# Patient Record
Sex: FEMALE | Race: WHITE | URBAN - METROPOLITAN AREA
[De-identification: names, ages, dates, MRNs, and addresses within clinical notes are randomized per-mention and may not be internally consistent; named-entity substitution may affect disease eponyms.]

---

## 2017-01-06 ENCOUNTER — TRANSFERRED RECORDS (OUTPATIENT)
Dept: HEALTH INFORMATION MANAGEMENT | Facility: CLINIC | Age: 27
End: 2017-01-06

## 2017-07-21 ENCOUNTER — OFFICE VISIT (OUTPATIENT)
Dept: FAMILY MEDICINE | Facility: CLINIC | Age: 27
End: 2017-07-21
Payer: COMMERCIAL

## 2017-07-21 VITALS
HEART RATE: 67 BPM | DIASTOLIC BLOOD PRESSURE: 74 MMHG | OXYGEN SATURATION: 98 % | HEIGHT: 64 IN | SYSTOLIC BLOOD PRESSURE: 104 MMHG | BODY MASS INDEX: 22.16 KG/M2 | TEMPERATURE: 97 F | WEIGHT: 129.8 LBS

## 2017-07-21 DIAGNOSIS — Z23 NEED FOR PROPHYLACTIC VACCINATION WITH TETANUS-DIPHTHERIA (TD): ICD-10-CM

## 2017-07-21 DIAGNOSIS — N75.0 CYST OF LEFT BARTHOLIN'S GLAND: ICD-10-CM

## 2017-07-21 DIAGNOSIS — Z00.00 ROUTINE GENERAL MEDICAL EXAMINATION AT A HEALTH CARE FACILITY: Primary | ICD-10-CM

## 2017-07-21 DIAGNOSIS — Z23 NEED FOR HPV VACCINE: ICD-10-CM

## 2017-07-21 DIAGNOSIS — Z11.3 SCREENING EXAMINATION FOR VENEREAL DISEASE: ICD-10-CM

## 2017-07-21 DIAGNOSIS — Z12.4 SCREENING FOR MALIGNANT NEOPLASM OF CERVIX: ICD-10-CM

## 2017-07-21 DIAGNOSIS — J31.0 OTHER CHRONIC RHINITIS: ICD-10-CM

## 2017-07-21 PROCEDURE — G0145 SCR C/V CYTO,THINLAYER,RESCR: HCPCS | Performed by: FAMILY MEDICINE

## 2017-07-21 PROCEDURE — 87491 CHLMYD TRACH DNA AMP PROBE: CPT | Performed by: FAMILY MEDICINE

## 2017-07-21 PROCEDURE — 87591 N.GONORRHOEAE DNA AMP PROB: CPT | Performed by: FAMILY MEDICINE

## 2017-07-21 PROCEDURE — 99385 PREV VISIT NEW AGE 18-39: CPT | Performed by: FAMILY MEDICINE

## 2017-07-21 RX ORDER — MONTELUKAST SODIUM 10 MG/1
10 TABLET ORAL AT BEDTIME
Qty: 30 TABLET | Refills: 11 | Status: SHIPPED | OUTPATIENT
Start: 2017-07-21 | End: 2018-05-07

## 2017-07-21 RX ORDER — FLUTICASONE PROPIONATE 50 MCG
2 SPRAY, SUSPENSION (ML) NASAL DAILY
Qty: 3 BOTTLE | Refills: 11 | COMMUNITY

## 2017-07-21 RX ORDER — LORATADINE 10 MG/1
10 TABLET ORAL DAILY
Qty: 30 TABLET | Refills: 1 | COMMUNITY

## 2017-07-21 RX ORDER — MOMETASONE FUROATE MONOHYDRATE 50 UG/1
2 SPRAY, METERED NASAL DAILY
Qty: 1 BOX | Refills: 11 | Status: SHIPPED | OUTPATIENT
Start: 2017-07-21 | End: 2018-05-07

## 2017-07-21 NOTE — PROGRESS NOTES
SUBJECTIVE:   CC: Keturah Mcnair is an 26 year old woman who presents for preventive health visit.   She reports post nasal dripping constantly, she does take Flonase over the counter and also Claritin she reports there was a plan to consider nasonex but not sure if its covered by insurance. She reports she has house cats for past 3 years, not sure if allergic to cats    She reports history of left bartholin cyst- and would like to know if she would need OBG/GYN referral for recurrence      She reports she is not sure if she noted breast lump on left self  breast exam 2 weeks ago. She reports there is no previous history of breast lump, family history of breast cancer, she is is nonsmoker      Healthy Habits:    Do you get at least three servings of calcium containing foods daily (dairy, green leafy vegetables, etc.)? yes    Amount of exercise or daily activities, outside of work: 4 day(s) per week 30 min    Problems taking medications regularly No    Medication side effects: No    Have you had an eye exam in the past two years? yes    Do you see a dentist twice per year? yes  Do you have sleep apnea, excessive snoring or daytime drowsiness?no      Today's PHQ-2 Score:   PHQ-2 ( 1999 Pfizer) 7/21/2017   Q1: Little interest or pleasure in doing things 0   Q2: Feeling down, depressed or hopeless 0   PHQ-2 Score 0         Abuse: Current or Past(Physical, Sexual or Emotional)- No  Do you feel safe in your environment - Yes  Social History   Substance Use Topics     Smoking status: Never Smoker     Smokeless tobacco: Never Used     Alcohol use Not on file     The patient does not drink >3 drinks per day nor >7 drinks per week.    Reviewed orders with patient.  Reviewed health maintenance and updated orders accordingly - Yes  Labs reviewed in EPIC    Mammogram not appropriate for this patient based on age.    Pertinent mammograms are reviewed under the imaging tab.  History of abnormal Pap smear: NO - age 21-29 PAP  "every 3 years recommended    Reviewed and updated as needed this visit by clinical staff  Tobacco  Allergies  Meds  Soc Hx        Reviewed and updated as needed this visit by Provider              ROS:  C: NEGATIVE for fever, chills, change in weight  I: NEGATIVE for worrisome rashes, moles or lesions  E: NEGATIVE for vision changes or irritation  ENT: POSITIVE for post nasal dripping   R: NEGATIVE for significant cough or SOB  BREAST: as in History of presenting illness   CV: NEGATIVE for chest pain, palpitations or peripheral edema  GI: NEGATIVE for nausea, abdominal pain, heartburn, or change in bowel habits  : NEGATIVE for unusual urinary or vaginal symptoms. Periods are regular.  M: NEGATIVE for significant arthralgias or myalgia  N: NEGATIVE for weakness, dizziness or paresthesias  P: NEGATIVE for changes in mood or affect    OBJECTIVE:   /74  Pulse 67  Temp 97  F (36.1  C) (Oral)  Ht 5' 4.25\" (1.632 m)  Wt 129 lb 12.8 oz (58.9 kg)  LMP 07/17/2017  SpO2 98%  BMI 22.11 kg/m2  EXAM:  GENERAL: healthy, alert and no distress  EYES: Eyes grossly normal to inspection, PERRL and conjunctivae and sclerae normal  HENT: ear canals and TM's normal, nose and mouth without ulcers or lesions  NECK: no adenopathy, no asymmetry, masses, or scars and thyroid normal to palpation  RESP: lungs clear to auscultation - no rales, rhonchi or wheezes  BREAST: normal without masses, tenderness or nipple discharge and no palpable axillary masses or adenopathy  CV: regular rate and rhythm, normal S1 S2, no S3 or S4, no murmur, click or rub, no peripheral edema and peripheral pulses strong  ABDOMEN: soft, nontender, no hepatosplenomegaly, no masses and bowel sounds normal   (female): normal female external genitalia, normal urethral meatus, vaginal mucosa pink, moist, well rugated, IUD in place. Normal mensuration/ cervix/adnexa/uterus without masses or discharge  MS: no gross musculoskeletal defects noted, no " "edema  SKIN: no suspicious lesions or rashes  NEURO: Normal strength and tone, mentation intact and speech normal  PSYCH: mentation appears normal, affect normal/bright    ASSESSMENT/PLAN:   (Z00.00) Routine general medical examination at a health care facility  (primary encounter diagnosis)  Plan: Please see patient instructions   No lumps on breast identifed- advised to continue self breast exam and if any concern, follow up as needed       (J31.0) Other chronic rhinitis  Plan: mometasone (NASONEX) 50 MCG/ACT spray, once daily as needed          montelukast (SINGULAIR) 10 MG tablet once daily as needed  For 2 weeks, if not better follow up as needed    Potential medication side effects were discussed with the patient; let me know if any occur.      (N75.0) Cyst of left Bartholin's gland  Comment: not acutly inflammed or tender  Plan: OB/GYN REFERRAL if recurrance      (Z11.3) Screening examination for venereal disease  Plan:  Chlamydia & gonorrhea     (Z12.4) Screening for malignant neoplasm of cervix  Plan: Pap imaged thin layer screen reflex to HPV if            (Z23) Need for HPV vaccine  (Z23) Need for prophylactic vaccination with tetanus-diphtheria (TD)  Plan: discussed- patient deffered      COUNSELING:   Reviewed preventive health counseling, as reflected in patient instructions       Regular exercise       Healthy diet/nutrition         reports that she has never smoked. She has never used smokeless tobacco.    Estimated body mass index is 22.11 kg/(m^2) as calculated from the following:    Height as of this encounter: 5' 4.25\" (1.632 m).    Weight as of this encounter: 129 lb 12.8 oz (58.9 kg).         Counseling Resources:  ATP IV Guidelines  Pooled Cohorts Equation Calculator  Breast Cancer Risk Calculator  FRAX Risk Assessment  ICSI Preventive Guidelines  Dietary Guidelines for Americans, 2010  USDA's MyPlate  ASA Prophylaxis  Lung CA Screening    Sirisha Ortega MD  [facility]  "

## 2017-07-21 NOTE — PATIENT INSTRUCTIONS
Regarding post nasal dripping- from  Chronic rhinitis- of unspecified allergy  Start montelukast 10 mg once daily   nasonex nasal spray once daily as needed - another option      Preventive Health Recommendations  Female Ages 18 to 25     Yearly exam:     See your health care provider every year in order to  o Review health changes.   o Discuss preventive care.    o Review your medicines if your doctor has prescribed any.      You should be tested each year for STDs (sexually transmitted diseases).       After age 20, talk to your provider about how often you should have cholesterol testing.      Starting at age 21, get a Pap test every three years. If you have an abnormal result, your doctor may have you test more often.      If you are at risk for diabetes, you should have a diabetes test (fasting glucose).     Shots:     Get a flu shot each year.     Get a tetanus shot every 10 years.     Consider getting the shot (vaccine) that prevents cervical cancer (Gardasil).    Nutrition:     Eat at least 5 servings of fruits and vegetables each day.    Eat whole-grain bread, whole-wheat pasta and brown rice instead of white grains and rice.    Talk to your provider about Calcium and Vitamin D.     Lifestyle    Exercise at least 150 minutes a week each week (30 minutes a day, 5 days a week). This will help you control your weight and prevent disease.    Limit alcohol to one drink per day.    No smoking.     Wear sunscreen to prevent skin cancer.    See your dentist every six months for an exam and cleaning.

## 2017-07-21 NOTE — MR AVS SNAPSHOT
After Visit Summary   7/21/2017    Keturah Mcnair    MRN: [id number]           Patient Information     Date Of Birth          1990        Visit Information        Provider Department      7/21/2017 3:00 PM Sirisha Ortega MD [facility]        Today's Diagnoses     Routine general medical examination at a health care facility    -  1    Other chronic rhinitis        Cyst of left Bartholin's gland        Screening examination for venereal disease        Screening for malignant neoplasm of cervix        Need for HPV vaccine        Need for prophylactic vaccination with tetanus-diphtheria (TD)          Care Instructions    Regarding post nasal dripping- from  Chronic rhinitis- of unspecified allergy  Start montelukast 10 mg once daily   nasonex nasal spray once daily as needed - another option      Preventive Health Recommendations  Female Ages 18 to 25     Yearly exam:     See your health care provider every year in order to  o Review health changes.   o Discuss preventive care.    o Review your medicines if your doctor has prescribed any.      You should be tested each year for STDs (sexually transmitted diseases).       After age 20, talk to your provider about how often you should have cholesterol testing.      Starting at age 21, get a Pap test every three years. If you have an abnormal result, your doctor may have you test more often.      If you are at risk for diabetes, you should have a diabetes test (fasting glucose).     Shots:     Get a flu shot each year.     Get a tetanus shot every 10 years.     Consider getting the shot (vaccine) that prevents cervical cancer (Gardasil).    Nutrition:     Eat at least 5 servings of fruits and vegetables each day.    Eat whole-grain bread, whole-wheat pasta and brown rice instead of white grains and rice.    Talk to your provider about Calcium and Vitamin D.     Lifestyle    Exercise at least 150 minutes a week each week (30 minutes  a day, 5 days a week). This will help you control your weight and prevent disease.    Limit alcohol to one drink per day.    No smoking.     Wear sunscreen to prevent skin cancer.    See your dentist every six months for an exam and cleaning.          Follow-ups after your visit        Additional Services     OB/GYN REFERRAL       Your provider has referred you to:  FM[facility]: [facility] [phone]   [email or web address]  UMP: [facility] [phone]   [email or web address]    Please be aware that coverage of these services is subject to the terms and limitations of your health insurance plan.  Call member services at your health plan with any benefit or coverage questions.      Please bring the following with you to your appointment:    (1) Any X-Rays, CTs or MRIs which have been performed.  Contact the facility where they were done to arrange for  prior to your scheduled appointment.   (2) List of current medications   (3) This referral request   (4) Any documents/labs given to you for this referral                  Who to contact     If you have questions or need follow up information about today's clinic visit or your schedule please contact [facility] directly at [phone].  Normal or non-critical lab and imaging results will be communicated to you by MyChart, letter or phone within 4 business days after the clinic has received the results. If you do not hear from us within 7 days, please contact the clinic through MyChart or phone. If you have a critical or abnormal lab result, we will notify you by phone as soon as possible.  Submit refill requests through [employer] or call your pharmacy and they will forward the refill request to us. Please allow 3 business days for your refill to be completed.          Additional Information About Your Visit        [address]  "Information     [facility] gives you secure access to your electronic health record. If you see a primary care provider, you can also send messages to your care team and make appointments. If you have questions, please call your primary care clinic.  If you do not have a primary care provider, please call [phone] and they will assist you.        Care EveryWhere ID     This is your Care EveryWhere ID. This could be used by other organizations to access your [facility] medical records  [id number]        Your Vitals Were     Pulse Temperature Height Last Period Pulse Oximetry BMI (Body Mass Index)    67 97  F (36.1  C) (Oral) 5' 4.25\" (1.632 m) 07/17/2017 98% 22.11 kg/m2       Blood Pressure from Last 3 Encounters:   07/21/17 104/74    Weight from Last 3 Encounters:   07/21/17 129 lb 12.8 oz (58.9 kg)              We Performed the Following     CHLAMYDIA TRACHOMATIS PCR     NEISSERIA GONORRHOEA PCR     OB/GYN REFERRAL     Pap imaged thin layer screen reflex to HPV if ASCUS - recommend age 25 - 29          Today's Medication Changes          These changes are accurate as of: 7/21/17 11:59 PM.  If you have any questions, ask your nurse or doctor.               Start taking these medicines.        Dose/Directions    mometasone 50 MCG/ACT spray   Commonly known as:  NASONEX   Used for:  Other chronic rhinitis   Started by:  Sirisha Ortega MD        Dose:  2 spray   Spray 2 sprays into both nostrils daily   Quantity:  1 Box   Refills:  11       montelukast 10 MG tablet   Commonly known as:  SINGULAIR   Used for:  Other chronic rhinitis   Started by:  Sirisha Ortega MD        Dose:  10 mg   Take 1 tablet (10 mg) by mouth At Bedtime   Quantity:  30 tablet   Refills:  11            Where to get your medicines      These medications were sent to [facility] [place], [place] - [address]  [address] [address]     Phone:  [phone]     mometasone 50 MCG/ACT spray    " montelukast 10 MG tablet                Primary Care Provider    None Specified       No primary provider on file.        Equal Access to Services     ERNESTOFATOUMATA [facility] : Hadii aad ku hadmltreva Oliva, walaurada chiquiyoha, mabelyoon pachecosheridanarcenio pereapegarcenio, waxay idiin haytayloriesha poonanglelivia gaston. So [facility] [phone].    ATENCIÓN: Si habla español, tiene a beckwith disposición servicios gratuitos de asistencia lingüística. Llame al [phone].    We comply with applicable federal civil rights laws and [place] laws. We do not discriminate on the basis of race, color, national origin, age, disability sex, sexual orientation or gender identity.            Thank you!     Thank you for choosing [facility]  for your care. Our goal is always to provide you with excellent care. Hearing back from our patients is one way we can continue to improve our services. Please take a few minutes to complete the written survey that you may receive in the mail after your visit with us. Thank you!             Your Updated Medication List - Protect others around you: Learn how to safely use, store and throw away your medicines at www.disposemymeds.org.          This list is accurate as of: 7/21/17 11:59 PM.  Always use your most recent med list.                   Brand Name Dispense Instructions for use Diagnosis    CLARITIN 10 MG tablet   Generic drug:  loratadine     30 tablet    Take 1 tablet (10 mg) by mouth daily        fluticasone 50 MCG/ACT spray    FLONASE    3 Bottle    Spray 2 sprays into both nostrils daily        mometasone 50 MCG/ACT spray    NASONEX    1 Box    Spray 2 sprays into both nostrils daily    Other chronic rhinitis       montelukast 10 MG tablet    SINGULAIR    30 tablet    Take 1 tablet (10 mg) by mouth At Bedtime    Other chronic rhinitis

## 2017-07-21 NOTE — NURSING NOTE
"Chief Complaint   Patient presents with     Physical     not fasting     /74  Pulse 67  Temp 97  F (36.1  C) (Oral)  Ht 5' 4.25\" (1.632 m)  Wt 129 lb 12.8 oz (58.9 kg)  LMP 07/17/2017  SpO2 98%  BMI 22.11 kg/m2 Estimated body mass index is 22.11 kg/(m^2) as calculated from the following:    Height as of this encounter: 5' 4.25\" (1.632 m).    Weight as of this encounter: 129 lb 12.8 oz (58.9 kg).  BP completed using cuff size: regular       Health Maintenance due pending provider review:  Pap Smear    PAP--Possibly completing today, per Provider review      Washington Chun CMA  "

## 2017-07-25 LAB
C TRACH DNA SPEC QL NAA+PROBE: NORMAL
N GONORRHOEA DNA SPEC QL NAA+PROBE: NORMAL
SPECIMEN SOURCE: NORMAL
SPECIMEN SOURCE: NORMAL

## 2017-07-27 LAB
COPATH REPORT: NORMAL
PAP: NORMAL

## 2017-09-21 ENCOUNTER — RADIANT APPOINTMENT (OUTPATIENT)
Dept: GENERAL RADIOLOGY | Facility: CLINIC | Age: 27
End: 2017-09-21
Attending: FAMILY MEDICINE
Payer: COMMERCIAL

## 2017-09-21 ENCOUNTER — OFFICE VISIT (OUTPATIENT)
Dept: ORTHOPEDICS | Facility: CLINIC | Age: 27
End: 2017-09-21
Payer: COMMERCIAL

## 2017-09-21 VITALS
WEIGHT: 129 LBS | SYSTOLIC BLOOD PRESSURE: 104 MMHG | DIASTOLIC BLOOD PRESSURE: 74 MMHG | BODY MASS INDEX: 22.02 KG/M2 | HEIGHT: 64 IN

## 2017-09-21 DIAGNOSIS — G25.89 SCAPULAR DYSKINESIS: ICD-10-CM

## 2017-09-21 DIAGNOSIS — M25.511 PAIN IN JOINT OF RIGHT SHOULDER: ICD-10-CM

## 2017-09-21 DIAGNOSIS — M75.41 IMPINGEMENT SYNDROME OF RIGHT SHOULDER: Primary | ICD-10-CM

## 2017-09-21 PROCEDURE — 99213 OFFICE O/P EST LOW 20 MIN: CPT | Performed by: FAMILY MEDICINE

## 2017-09-21 PROCEDURE — 73030 X-RAY EXAM OF SHOULDER: CPT | Mod: RT

## 2017-09-21 ASSESSMENT — ENCOUNTER SYMPTOMS
SENSORY CHANGE: 0
TINGLING: 0
FOCAL WEAKNESS: 0
NECK PAIN: 0

## 2017-09-21 NOTE — NURSING NOTE
"Chief Complaint   Patient presents with     Shoulder Pain       Initial /74  Ht 5' 4.25\" (1.632 m)  Wt 129 lb (58.5 kg)  BMI 21.97 kg/m2 Estimated body mass index is 21.97 kg/(m^2) as calculated from the following:    Height as of this encounter: 5' 4.25\" (1.632 m).    Weight as of this encounter: 129 lb (58.5 kg).  Medication Reconciliation: complete    "

## 2017-09-21 NOTE — MR AVS SNAPSHOT
After Visit Summary   9/21/2017    Keturah Mcnair    MRN: [id number]           Patient Information     Date Of Birth          1990        Visit Information        Provider Department      9/21/2017 11:00 AM Gee Boswell MD [facility]        Today's Diagnoses     Impingement syndrome of right shoulder    -  1    Scapular dyskinesis        Pain in joint of right shoulder           Follow-ups after your visit        Additional Services     [facility] PT, HAND, AND CHIROPRACTIC REFERRAL       **This order will print in the [facility] Scheduling Office**    Physical Therapy, Hand Therapy and Chiropractic Care are available through:    *[facility]  *[facility]  *[facility]    Call one number to schedule at any of the above locations: [phone].    Your provider has referred you to: Physical Therapy at [facility] or [facility]     Indication/Reason for Referral:    Impingement syndrome of right shoulder  Scapular dyskinesis    Onset of Illness:   Therapy Orders: Evaluate and Treat  Special Programs: None  Special Request: None    Christiana Villeda      Additional Comments for the Therapist or Chiropractor:     Please be aware that coverage of these services is subject to the terms and limitations of your health insurance plan.  Call member services at your health plan with any benefit or coverage questions.      Please bring the following to your appointment:    *Your personal calendar for scheduling future appointments  *Comfortable clothing                  Who to contact     If you have questions or need follow up information about today's clinic visit or your schedule please contact [facility] directly at [phone].  Normal or non-critical lab and imaging results will be communicated to you by MyChart, letter or phone within 4 business days after  "the clinic has received the results. If you do not hear from us within 7 days, please contact the clinic through [employer] or phone. If you have a critical or abnormal lab result, we will notify you by phone as soon as possible.  Submit refill requests through [employer] or call your pharmacy and they will forward the refill request to us. Please allow 3 business days for your refill to be completed.          Additional Information About Your Visit        [employer] Information     [employer] gives you secure access to your electronic health record. If you see a primary care provider, you can also send messages to your care team and make appointments. If you have questions, please call your primary care clinic.  If you do not have a primary care provider, please call [phone] and they will assist you.        Care EveryWhere ID     This is your Care EveryWhere ID. This could be used by other organizations to access your [facility] medical records  [id number]        Your Vitals Were     Height BMI (Body Mass Index)                5' 4.25\" (1.632 m) 21.97 kg/m2           Blood Pressure from Last 3 Encounters:   09/21/17 104/74   07/21/17 104/74    Weight from Last 3 Encounters:   09/21/17 129 lb (58.5 kg)   07/21/17 129 lb 12.8 oz (58.9 kg)              We Performed the Following     [facility] PT, HAND, AND CHIROPRACTIC REFERRAL        Primary Care Provider    None Specified       No primary provider on file.        Equal Access to Services     PARTHA GONZALEZ : Hadii nicole crespo Sotiffany, waaxda luqadaha, qaybta kaalfrancy anderson . So [facility] [phone].    ATENCIÓN: Si habla español, tiene a beckwith disposición servicios gratuitos de asistencia lingüística. Tegan al [phone].    We comply with applicable federal civil rights laws and [place] laws. We do not discriminate on the basis of race, color, national origin, age, disability sex, sexual orientation or gender identity.          "   Thank you!     Thank you for choosing [facility]  for your care. Our goal is always to provide you with excellent care. Hearing back from our patients is one way we can continue to improve our services. Please take a few minutes to complete the written survey that you may receive in the mail after your visit with us. Thank you!             Your Updated Medication List - Protect others around you: Learn how to safely use, store and throw away your medicines at www.disposemymeds.org.          This list is accurate as of: 9/21/17 12:17 PM.  Always use your most recent med list.                   Brand Name Dispense Instructions for use Diagnosis    CLARITIN 10 MG tablet   Generic drug:  loratadine     30 tablet    Take 1 tablet (10 mg) by mouth daily        fluticasone 50 MCG/ACT spray    FLONASE    3 Bottle    Spray 2 sprays into both nostrils daily        mometasone 50 MCG/ACT spray    NASONEX    1 Box    Spray 2 sprays into both nostrils daily    Other chronic rhinitis       montelukast 10 MG tablet    SINGULAIR    30 tablet    Take 1 tablet (10 mg) by mouth At Bedtime    Other chronic rhinitis

## 2017-09-21 NOTE — PROGRESS NOTES
[facility]   Clinic Visit s Sep 21, 2017    PCP: No primary care provider on file.      Keturah is a 26 year old female who is seen as self referral for   Chief Complaint   Patient presents with     Shoulder Pain       Injury: Patient describes injury as sleeping on a firm mattress on her right side.      right-hand dominant    Location of Pain: right anterior shoulder  Duration of Pain: 6 month(s)  Rating of Pain at worst: 8/10  Rating of Pain Currently: 3/10  Pain is worse with: computer work, at night (wakes her up)  Pain is better with: rest  Treatment so far consists of: rest/activity avoidance, ice, ibuprofen and home exercises  Associated symptoms: radiating pain to hands, occasional loss of strength, just over the last couple weeks, .   Prior History of related problems: none    Social History: works at nurse     Hard to sleep d/t pain.    Patient Active Problem List    Diagnosis Date Noted     Cyst of left Bartholin's gland 07/27/2017     Priority: Medium     not acutly inflmmed or tender         Other chronic rhinitis 07/21/2017     Priority: Medium       Family History   Problem Relation Age of Onset     HEART DISEASE Maternal Grandmother      Thyroid Disease Maternal Grandmother      Thyroid Disease Paternal Grandmother      Coronary Artery Disease Paternal Grandfather      Hypertension Paternal Grandfather      CEREBROVASCULAR DISEASE Paternal Grandfather      Prostate Cancer Paternal Grandfather        Social History     Social History     Marital status: Single     Spouse name: N/A     Number of children: N/A     Years of education: N/A     Social History Main Topics     Smoking status: Never Smoker     Smokeless tobacco: Never Used     Alcohol use None     Drug use: No     Past Surgical History:   Procedure Laterality Date     MARSUPIALIZATION BARTHOLIN CYST Left 12/2016       Review of Systems   Musculoskeletal: Positive for joint pain. Negative for neck pain.  "  Neurological: Negative for tingling, sensory change and focal weakness.   All other systems reviewed and are negative.        Physical Exam  /74  Ht 5' 4.25\" (1.632 m)  Wt 129 lb (58.5 kg)  BMI 21.97 kg/m2  Constitutional:well-developed, well-nourished, and in no distress.   Cardiovascular: Intact distal pulses.    Neurological: alert. Gait Normal:   Gait, station, stance, and balance appear normal for age  Skin: Skin is warm and dry.   Psychiatric: Mood and affect normal.   Respiratory: unlabored, speaks in full sentences  Lymph: no LAD, no lymphangitis    Left Shoulder Exam     Tenderness   None    Range of Motion   Active Abduction:                       Normal  Passive Abduction:                    Normal  Extension:                                  Normal  Forward Flexion:                        Normal  External Rotation:                      Normal  Internal Rotation 0 degrees:      Normal  Internal Rotation 90 degrees:    Normal    Muscle Strength   Abduction:            5/5  Internal Rotation:  5/5  External Rotation: 5/5  Supraspinatus:     5/5  Subscapularis:     5/5  Biceps:                 5/5    Tests   Impingement:   Negative  Blanca:          Negative  Cross Arm:      Negative  Drop Arm:        Negative  Apprehension: Negative  Sulcus:            Negative    Right Shoulder Exam     Tenderness   The patient is experiencing tenderness in the anterior and anterolateral upper arm.    Range of Motion   Active Abduction:                       Normal  Passive Abduction:                    Normal  Extension:                                  Normal  Forward Flexion:                        Normal  External Rotation:                      Normal  Internal Rotation 0 degrees:      Normal  Internal Rotation 90 degrees:    Normal    Muscle Strength   Abduction:            5/5  Internal Rotation:  5/5  External Rotation: 5/5  Supraspinatus:     5/5  Subscapularis:     5/5  Biceps:                 " 5/5    Tests   Impingement:   Negative  Blanca:          Positive  Cross Arm:      Negative  Drop Arm:        Positive  Apprehension: Negative  Sulcus:            Negative    Comments:  Inferior scapular pole prominent.  Hyperlordotic posture  Mild scapular asymmetry compared to LEFT side            X-ray images Ordered and independently reviewed by me in the office today with the patient. X-ray shows: normal shoulder    ASSESSMENT/PLAN    ICD-10-CM    1. Impingement syndrome of right shoulder M75.41 [facility] PT, HAND, AND CHIROPRACTIC REFERRAL   2. Scapular dyskinesis G25.89 [facility] PT, HAND, AND CHIROPRACTIC REFERRAL   3. Pain in joint of right shoulder M25.511 XR Shoulder Right G/E 3 Views     Atypical onset, uncertain of trigger from sleeping on firm mattress.    Discussed nature of impingement syndromes in association with scapular dysfunction and the possibility of AC spurring creating a mechanical impingement of the rotator cuff structures.  Treatment options reviewed including medications, physical therapy, and injections  Pt declines cortisone steroid injection despite sleep disruption, may return at any time if for cortisone steroid injection if desired.    Will send to PHYSICAL THERAPY to address scapular and RC mechanics.

## 2017-09-25 ENCOUNTER — TELEPHONE (OUTPATIENT)
Dept: FAMILY MEDICINE | Facility: CLINIC | Age: 27
End: 2017-09-25

## 2017-09-25 DIAGNOSIS — Z23 NEED FOR VACCINATION: Primary | ICD-10-CM

## 2017-09-25 NOTE — TELEPHONE ENCOUNTER
Reason for Call: Request for an order or referral:    Order or referral being requested: HPV shot    Date needed: as soon as possible    Has the patient been seen by the PCP for this problem? YES    Additional comments: appt 10/2    Phone number Patient can be reached at:  Home number on file [phone] (home)    Best Time:  any    Can we leave a detailed message on this number?  YES    Call taken on 9/25/2017 at 10:12 AM by Hilda Hughes

## 2017-09-29 ENCOUNTER — THERAPY VISIT (OUTPATIENT)
Dept: PHYSICAL THERAPY | Facility: CLINIC | Age: 27
End: 2017-09-29
Payer: COMMERCIAL

## 2017-09-29 DIAGNOSIS — M25.511 ACUTE PAIN OF RIGHT SHOULDER: Primary | ICD-10-CM

## 2017-09-29 PROCEDURE — 97112 NEUROMUSCULAR REEDUCATION: CPT | Mod: GP | Performed by: PHYSICAL THERAPIST

## 2017-09-29 PROCEDURE — 97110 THERAPEUTIC EXERCISES: CPT | Mod: GP | Performed by: PHYSICAL THERAPIST

## 2017-09-29 PROCEDURE — 97161 PT EVAL LOW COMPLEX 20 MIN: CPT | Mod: GP | Performed by: PHYSICAL THERAPIST

## 2017-09-29 NOTE — MR AVS SNAPSHOT
After Visit Summary   9/29/2017    eKturah Mcnair    MRN: [id number]           Patient Information     Date Of Birth          1990        Visit Information        Provider Department      9/29/2017 7:20 AM Goran Rocha, GIOVANNA [facility]        Today's Diagnoses     Acute pain of right shoulder    -  1       Follow-ups after your visit        Your next 10 appointments already scheduled     Oct 02, 2017 11:30 AM CDT   Nurse Only with UP NURSE   [facility]    [address]  [facility] [id number] [phone]            Oct 10, 2017  5:00 PM CDT   [facility] Extremity with Nas Zimmer Pt, PT   [facility] ([facility]  )    [address]  [facility] [id number] [phone]              Who to contact     If you have questions or need follow up information about today's clinic visit or your schedule please contact [facility]AP[facility] directly at [phone].  Normal or non-critical lab and imaging results will be communicated to you by [employer], letter or phone within 4 business days after the clinic has received the results. If you do not hear from us within 7 days, please contact the clinic through [employer] or phone. If you have a critical or abnormal lab result, we will notify you by phone as soon as possible.  Submit refill requests through [employer] or call your pharmacy and they will forward the refill request to us. Please allow 3 business days for your refill to be completed.          Additional Information About Your Visit        MyChart Information     [employer] gives you secure access to your electronic health record. If you see a primary care provider, you can also send messages to your care team and make appointments. If you have questions, please call your primary care clinic.  If you do not  have a primary care provider, please call [phone] and they will assist you.        Care EveryWhere ID     This is your Care EveryWhere ID. This could be used by other organizations to access your [facility] medical records  [id number]         Blood Pressure from Last 3 Encounters:   09/21/17 104/74   07/21/17 104/74    Weight from Last 3 Encounters:   09/21/17 58.5 kg (129 lb)   07/21/17 58.9 kg (129 lb 12.8 oz)              We Performed the Following     HC PT EVAL, LOW COMPLEXITY     [facility] INITIAL EVAL REPORT     NEUROMUSCULAR RE-EDUCATION     THERAPEUTIC EXERCISES        Primary Care Provider    None Specified       No primary provider on file.        Equal Access to Services     FATOUMATA GONZALEZ : Hadii nicole Oliva, glenn jacinto, salima mims, francy luu . So [facility] [phone].    ATENCIÓN: Si habla español, tiene a beckwith disposición servicios gratuitos de asistencia lingüística. Llame al [phone].    We comply with applicable federal civil rights laws and [place] laws. We do not discriminate on the basis of race, color, national origin, age, disability sex, sexual orientation or gender identity.            Thank you!     Thank you for choosing [facility]  for your care. Our goal is always to provide you with excellent care. Hearing back from our patients is one way we can continue to improve our services. Please take a few minutes to complete the written survey that you may receive in the mail after your visit with us. Thank you!             Your Updated Medication List - Protect others around you: Learn how to safely use, store and throw away your medicines at www.disposemymeds.org.          This list is accurate as of: 9/29/17  9:32 AM.  Always use your most recent med list.                   Brand Name Dispense Instructions for use Diagnosis    CLARITIN 10 MG tablet   Generic drug:  loratadine     30  tablet    Take 1 tablet (10 mg) by mouth daily        fluticasone 50 MCG/ACT spray    FLONASE    3 Bottle    Spray 2 sprays into both nostrils daily        mometasone 50 MCG/ACT spray    NASONEX    1 Box    Spray 2 sprays into both nostrils daily    Other chronic rhinitis       montelukast 10 MG tablet    SINGULAIR    30 tablet    Take 1 tablet (10 mg) by mouth At Bedtime    Other chronic rhinitis

## 2017-09-29 NOTE — LETTER
[facility] PHYSICAL [facility]  [address]  [facility] [id number] [phone]    2017    Re: Keturah Mcnair   :   1990  MRN:  [id number]   REFERRING PHYSICIAN:   Gee Boswell    [facility]    Date of Initial Evaluation:  2017  Visits:  Rxs Used: 1  Reason for Referral:  Acute pain of right shoulder    EVALUATION SUMMARY    Subjective:  Patient is a 26 year old female presenting with rehab right shoulder hpi. The history is provided by the patient. No  was used.   Keturah Mcnair is a 26 year old female with a right shoulder condition.  Condition occurred with:  Other.  Condition occurred: at home.  This is a recurrent condition  Patient first started feeling pain in 2017 after sleeping on a hard mattress. Pain has waxed and waned ever since. Was seen by MD 17 with negative x-ray. Injection was offered, but declined.    Patient reports pain:  Lateral.  Radiates to:  Upper arm.  Pain is described as aching  and reported as 7/10 and 8/10.  Associated symptoms:  Painful arc and loss of strength. Pain is worse during the day.  Symptoms are exacerbated by using arm overhead, lying on extremity, using arm behind back, using arm at shoulder level and lifting and relieved by rest and NSAID's.  Since onset symptoms are unchanged.  General health as reported by patient is good.  Pertinent medical history includes:  Anemia.  Medical allergies: yes (PCN).    Current medications:  Anti-inflammatory.  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  Primary job tasks include:  Prolonged standing, repetitive tasks and lifting.  Red flags:  Pain at rest/night.                 Objective:  Standing Alignment:    Shoulder/UE:  Scapular winging R       Shoulder Evaluation:  ROM:  AROM:  normal   PROM:  normal  Strength:  : 4/5 terres minor  and empty Can R with pain, ER 4+/5 mild pain, IR 5/5/ no pain.  Stability Testing:  not assessed  Special Tests:  Special tests assessed shoulder: + Blanca Leodan R.  Right shoulder positive for the following special tests:Impingement  Right shoulder negative for the following special tests:Labral and Rotator cuff tear  Mobility Tests:  normal    Re: Keturah Mcnair   :   1990    Assessment/Plan:    Patient is a 26 year old female with right side shoulder complaints.    Patient has the following significant findings with corresponding treatment plan.                Diagnosis 1:  R shoulder impingement  Pain -  self management, education and home program  Decreased strength - therapeutic exercise and therapeutic activities  Impaired muscle performance - neuro re-education  Decreased function - therapeutic activities    Therapy Evaluation Codes:   1) History comprised of:   Personal factors that impact the plan of care:      None.    Comorbidity factors that impact the plan of care are:      None.     Medications impacting care: Anti-inflammatory.  2) Examination of Body Systems comprised of:   Body structures and functions that impact the plan of care:      Shoulder.   Activity limitations that impact the plan of care are:      Dressing, Lifting, Sports, Throwing and Sleeping.  3) Clinical presentation characteristics are:   Stable/Uncomplicated.  4) Decision-Making    Low complexity using standardized patient assessment instrument and/or measureable assessment of functional outcome.  Cumulative Therapy Evaluation is: Low complexity.    Previous and current functional limitations:  (See Goal Flow Sheet for this information)    Short term and Long term goals: (See Goal Flow Sheet for this information)     Communication ability:  Patient appears to be able to clearly communicate and understand verbal and written communication and follow directions correctly.  Treatment Explanation - The following has been  discussed with the patient:   RX ordered/plan of care  Anticipated outcomes  Possible risks and side effects  This patient would benefit from PT intervention to resume normal activities.   Rehab potential is excellent.    Frequency:  2 X a month, once daily  Duration:  for 3 months  Discharge Plan:  Achieve all LTG.  Independent in home treatment program.  Reach maximal therapeutic benefit.              Re: Keturah Mcnair   :   1990    Thank you for your referral.    INQUIRIES  Therapist: Nas Zimmer, PT  [facility]  [address] [place] [address] [id number]  [facility] [address]  Phone: [phone]  Fax: [phone]

## 2017-09-29 NOTE — PROGRESS NOTES
Subjective:    Patient is a 26 year old female presenting with rehab right shoulder hpi. The history is provided by the patient. No  was used.   Keturah Mcnair is a 26 year old female with a right shoulder condition.  Condition occurred with:  Other.  Condition occurred: at home.  This is a recurrent condition  Patient first started feeling pain in Feb 2017 after sleeping on a hard mattress. Pain has waxed and waned ever since. Was seen by MD 9/21/17 with negative x-ray. Injection was offered, but declined.    Patient reports pain:  Lateral.  Radiates to:  Upper arm.  Pain is described as aching  and reported as 7/10 and 8/10.  Associated symptoms:  Painful arc and loss of strength. Pain is worse during the day.  Symptoms are exacerbated by using arm overhead, lying on extremity, using arm behind back, using arm at shoulder level and lifting and relieved by rest and NSAID's.  Since onset symptoms are unchanged.        General health as reported by patient is good.  Pertinent medical history includes:  Anemia.  Medical allergies: yes (PCN).    Current medications:  Anti-inflammatory.  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  Primary job tasks include:  Prolonged standing, repetitive tasks and lifting.        Red flags:  Pain at rest/night.                        Objective:    Standing Alignment:      Shoulder/UE:  Scapular winging R                                       Shoulder Evaluation:  ROM:  AROM:  normal                            PROM:  normal                                Strength:  : 4/5 terres minor and empty Can R with pain, ER 4+/5 mild pain, IR 5/5/ no pain.                      Stability Testing:  not assessed      Special Tests:  Special tests assessed shoulder: + Blanca Leodan R.    Right shoulder positive for the following special tests:Impingement  Right shoulder negative for the following special tests:Labral and Rotator cuff  tear    Mobility Tests:  normal                                                 General     ROS    Assessment/Plan:      Patient is a 26 year old female with right side shoulder complaints.    Patient has the following significant findings with corresponding treatment plan.                Diagnosis 1:  R shoulder impingement  Pain -  self management, education and home program  Decreased strength - therapeutic exercise and therapeutic activities  Impaired muscle performance - neuro re-education  Decreased function - therapeutic activities    Therapy Evaluation Codes:   1) History comprised of:   Personal factors that impact the plan of care:      None.    Comorbidity factors that impact the plan of care are:      None.     Medications impacting care: Anti-inflammatory.  2) Examination of Body Systems comprised of:   Body structures and functions that impact the plan of care:      Shoulder.   Activity limitations that impact the plan of care are:      Dressing, Lifting, Sports, Throwing and Sleeping.  3) Clinical presentation characteristics are:   Stable/Uncomplicated.  4) Decision-Making    Low complexity using standardized patient assessment instrument and/or measureable assessment of functional outcome.  Cumulative Therapy Evaluation is: Low complexity.    Previous and current functional limitations:  (See Goal Flow Sheet for this information)    Short term and Long term goals: (See Goal Flow Sheet for this information)     Communication ability:  Patient appears to be able to clearly communicate and understand verbal and written communication and follow directions correctly.  Treatment Explanation - The following has been discussed with the patient:   RX ordered/plan of care  Anticipated outcomes  Possible risks and side effects  This patient would benefit from PT intervention to resume normal activities.   Rehab potential is excellent.    Frequency:  2 X a month, once daily  Duration:  for 3 months  Discharge  Plan:  Achieve all LTG.  Independent in home treatment program.  Reach maximal therapeutic benefit.    Please refer to the daily flowsheet for treatment today, total treatment time and time spent performing 1:1 timed codes.

## 2017-10-02 ENCOUNTER — ALLIED HEALTH/NURSE VISIT (OUTPATIENT)
Dept: NURSING | Facility: CLINIC | Age: 27
End: 2017-10-02
Payer: COMMERCIAL

## 2017-10-02 DIAGNOSIS — Z23 NEED FOR VACCINATION: ICD-10-CM

## 2017-10-02 PROCEDURE — 90651 9VHPV VACCINE 2/3 DOSE IM: CPT

## 2017-10-02 PROCEDURE — 90471 IMMUNIZATION ADMIN: CPT

## 2017-10-02 PROCEDURE — 99207 ZZC NO CHARGE NURSE ONLY: CPT

## 2017-10-10 ENCOUNTER — THERAPY VISIT (OUTPATIENT)
Dept: PHYSICAL THERAPY | Facility: CLINIC | Age: 27
End: 2017-10-10
Payer: COMMERCIAL

## 2017-10-10 DIAGNOSIS — M25.511 ACUTE PAIN OF RIGHT SHOULDER: ICD-10-CM

## 2017-10-10 PROCEDURE — 97110 THERAPEUTIC EXERCISES: CPT | Mod: GP | Performed by: PHYSICAL THERAPIST

## 2017-10-10 PROCEDURE — 97112 NEUROMUSCULAR REEDUCATION: CPT | Mod: GP | Performed by: PHYSICAL THERAPIST

## 2017-11-07 ENCOUNTER — THERAPY VISIT (OUTPATIENT)
Dept: PHYSICAL THERAPY | Facility: CLINIC | Age: 27
End: 2017-11-07
Payer: COMMERCIAL

## 2017-11-07 DIAGNOSIS — M25.511 ACUTE PAIN OF RIGHT SHOULDER: ICD-10-CM

## 2017-11-07 PROCEDURE — 97110 THERAPEUTIC EXERCISES: CPT | Mod: GP | Performed by: PHYSICAL THERAPIST

## 2017-11-07 PROCEDURE — 97112 NEUROMUSCULAR REEDUCATION: CPT | Mod: GP | Performed by: PHYSICAL THERAPIST

## 2017-12-05 ENCOUNTER — THERAPY VISIT (OUTPATIENT)
Dept: PHYSICAL THERAPY | Facility: CLINIC | Age: 27
End: 2017-12-05
Payer: COMMERCIAL

## 2017-12-05 ENCOUNTER — ALLIED HEALTH/NURSE VISIT (OUTPATIENT)
Dept: NURSING | Facility: CLINIC | Age: 27
End: 2017-12-05
Payer: COMMERCIAL

## 2017-12-05 DIAGNOSIS — M25.511 ACUTE PAIN OF RIGHT SHOULDER: ICD-10-CM

## 2017-12-05 DIAGNOSIS — Z23 NEED FOR HPV VACCINATION: Primary | ICD-10-CM

## 2017-12-05 PROCEDURE — 90471 IMMUNIZATION ADMIN: CPT

## 2017-12-05 PROCEDURE — 97110 THERAPEUTIC EXERCISES: CPT | Mod: GP | Performed by: PHYSICAL THERAPIST

## 2017-12-05 PROCEDURE — 90651 9VHPV VACCINE 2/3 DOSE IM: CPT

## 2017-12-05 PROCEDURE — 97112 NEUROMUSCULAR REEDUCATION: CPT | Mod: GP | Performed by: PHYSICAL THERAPIST

## 2017-12-05 NOTE — PROGRESS NOTES
Subjective:    HPI                    Objective:    System    Physical Exam    General     ROS    Assessment/Plan:      DISCHARGE REPORT    Progress reporting period is from 9/29/17 to 12/5/17.       SUBJECTIVE  Subjective changes noted by patient:  No pain and back to weight lifting.       Current pain level is 0/10  .     Previous pain level was  8/10  .   Changes in function:  Yes (See Goal flowsheet attached for changes in current functional level)  Adverse reaction to treatment or activity: None    OBJECTIVE  Changes noted in objective findings:  Yes, AROM WNL  PROM WNL pain free  -Neer's and HK  MMT'sstrong and pain free all        ASSESSMENT/PLAN  Updated problem list and treatment plan: Diagnosis 1:  R impingement  Decreased function - therapeutic activities and home program  STG/LTGs have been met or progress has been made towards goals:  Yes (See Goal flow sheet completed today.)  Assessment of Progress: The patient's condition is improving.  The patient has met all of their long term goals.  Self Management Plans:  Patient has been instructed in a home treatment program.  Patient is independent in a home treatment program.  Patient is independent in self management of symptoms.    Keturah continues to require the following intervention to meet STG and LTG's:  PT intervention is no longer required to meet STG/LTG.    Recommendations:  This patient is ready to be discharged from therapy and continue their home treatment program.    Please refer to the daily flowsheet for treatment today, total treatment time and time spent performing 1:1 timed codes.

## 2017-12-05 NOTE — MR AVS SNAPSHOT
After Visit Summary   12/5/2017    Keturah Mcnair    MRN: [id number]           Patient Information     Date Of Birth          1990        Visit Information        Provider Department      12/5/2017 11:00 AM UP NURSE [facility]        Today's Diagnoses     Need for HPV vaccination    -  1       Follow-ups after your visit        Who to contact     If you have questions or need follow up information about today's clinic visit or your schedule please contact [facility] directly at [phone].  Normal or non-critical lab and imaging results will be communicated to you by VM[employer], letter or phone within 4 business days after the clinic has received the results. If you do not hear from us within 7 days, please contact the clinic through [employer]oupont or phone. If you have a critical or abnormal lab result, we will notify you by phone as soon as possible.  Submit refill requests through BlueSprig or call your pharmacy and they will forward the refill request to us. Please allow 3 business days for your refill to be completed.          Additional Information About Your Visit        MyChart Information     [employer]Spr[employer] gives you secure access to your electronic health record. If you see a primary care provider, you can also send messages to your care team and make appointments. If you have questions, please call your primary care clinic.  If you do not have a primary care provider, please call [phone] and they will assist you.        Care EveryWhere ID     This is your Care EveryWhere ID. This could be used by other organizations to access your [facility] medical records  [id number]         Blood Pressure from Last 3 Encounters:   09/21/17 104/74   07/21/17 104/74    Weight from Last 3 Encounters:   09/21/17 129 lb (58.5 kg)   07/21/17 129 lb 12.8 oz (58.9 kg)              We Performed the Following     HUMAN PAPILLOMA VIRUS (GARDASIL 9) VACCINE     INJECTION INTRAMUSCULAR OR SUB-Q         Primary Care Provider Office Phone # Fax #    Sirisha Herb Ortega [phone] [address] [facility] [address]        Equal Access to Services     PARTHA GONZALEZ : Katia ragland rogero Sosongali, walaurada luqadaha, qaybta kaalmada felicita, francy valladares laManuelitosharmila gaston. So [facility] [phone].    ATENCIÓN: Si habla español, tiene a beckwith disposición servicios gratuitos de asistencia lingüística. Llame al [phone].    We comply with applicable federal civil rights laws and [place] laws. We do not discriminate on the basis of race, color, national origin, age, disability, sex, sexual orientation, or gender identity.            Thank you!     Thank you for choosing [facility] NURSE  for your care. Our goal is always to provide you with excellent care. Hearing back from our patients is one way we can continue to improve our services. Please take a few minutes to complete the written survey that you may receive in the mail after your visit with us. Thank you!             Your Updated Medication List - Protect others around you: Learn how to safely use, store and throw away your medicines at www.disposemymeds.org.          This list is accurate as of: 12/5/17  4:38 PM.  Always use your most recent med list.                   Brand Name Dispense Instructions for use Diagnosis    CLARITIN 10 MG tablet   Generic drug:  loratadine     30 tablet    Take 1 tablet (10 mg) by mouth daily        fluticasone 50 MCG/ACT spray    FLONASE    3 Bottle    Spray 2 sprays into both nostrils daily        mometasone 50 MCG/ACT spray    NASONEX    1 Box    Spray 2 sprays into both nostrils daily    Other chronic rhinitis       montelukast 10 MG tablet    SINGULAIR    30 tablet    Take 1 tablet (10 mg) by mouth At Bedtime    Other chronic rhinitis

## 2017-12-05 NOTE — MR AVS SNAPSHOT
After Visit Summary   12/5/2017    Keturah Mcnair    MRN: [id number]           Patient Information     Date Of Birth          1990        Visit Information        Provider Department      12/5/2017 9:50 AM Goran Pt, GIOVANNA [facility] Physical Therapy        Today's Diagnoses     Acute pain of right shoulder           Follow-ups after your visit        Who to contact     If you have questions or need follow up information about today's clinic visit or your schedule please contact [facility] PHYSICAL THERAPY directly at [phone].  Normal or non-critical lab and imaging results will be communicated to you by Underground Solutionshart, letter or phone within 4 business days after the clinic has received the results. If you do not hear from us within 7 days, please contact the clinic through Visage Mobilet or phone. If you have a critical or abnormal lab result, we will notify you by phone as soon as possible.  Submit refill requests through [employer] or call your pharmacy and they will forward the refill request to us. Please allow 3 business days for your refill to be completed.          Additional Information About Your Visit        MyChart Information     [employer] gives you secure access to your electronic health record. If you see a primary care provider, you can also send messages to your care team and make appointments. If you have questions, please call your primary care clinic.  If you do not have a primary care provider, please call [phone] and they will assist you.        Care EveryWhere ID     This is your Care EveryWhere ID. This could be used by other organizations to access your [facility] medical records  [id number]         Blood Pressure from Last 3 Encounters:   09/21/17 104/74   07/21/17 104/74    Weight from Last 3 Encounters:   09/21/17 58.5 kg (129 lb)   07/21/17 58.9 kg (129 lb 12.8 oz)              We Performed the Following      PUJA PROGRESS NOTES REPORT     NEUROMUSCULAR RE-EDUCATION     THERAPEUTIC EXERCISES        Primary Care Provider Office Phone # Fax #    Sirisha Herb Ortega [phone] [address] [facility] [address]        Equal Access to Services     ERNESTOFATOUMATA LISA : Hadtesha nicole ragland rogero Sosongali, waaxda luqadaha, qaybta kaalmada adeegyada, francy collazon eliazar valladares lacharisseiesha gaston. So [facility] [phone].    ATENCIÓN: Si habla español, tiene a beckwith disposición servicios gratuitos de asistencia lingüística. Llame al [phone].    We comply with applicable federal civil rights laws and [place] laws. We do not discriminate on the basis of race, color, national origin, age, disability, sex, sexual orientation, or gender identity.            Thank you!     Thank you for choosing [facility]THLETIC MEDICINE [facility] PHYSICAL THERAPY  for your care. Our goal is always to provide you with excellent care. Hearing back from our patients is one way we can continue to improve our services. Please take a few minutes to complete the written survey that you may receive in the mail after your visit with us. Thank you!             Your Updated Medication List - Protect others around you: Learn how to safely use, store and throw away your medicines at www.disposemymeds.org.          This list is accurate as of: 12/5/17 11:05 AM.  Always use your most recent med list.                   Brand Name Dispense Instructions for use Diagnosis    CLARITIN 10 MG tablet   Generic drug:  loratadine     30 tablet    Take 1 tablet (10 mg) by mouth daily        fluticasone 50 MCG/ACT spray    FLONASE    3 Bottle    Spray 2 sprays into both nostrils daily        mometasone 50 MCG/ACT spray    NASONEX    1 Box    Spray 2 sprays into both nostrils daily    Other chronic rhinitis       montelukast 10 MG tablet    SINGULAIR    30 tablet    Take 1 tablet (10 mg) by mouth At Bedtime    Other chronic rhinitis

## 2017-12-05 NOTE — PROGRESS NOTES
Screening Questionnaire for Adult Immunization    Are you sick today?   No   Do you have allergies to medications, food, a vaccine component or latex?   No   Have you ever had a serious reaction after receiving a vaccination?   No   Do you have a long-term health problem with heart disease, lung disease, asthma, kidney disease, metabolic disease (e.g. diabetes), anemia, or other blood disorder?   No   Do you have cancer, leukemia, HIV/AIDS, or any other immune system problem?   No   In the past 3 months, have you taken medications that affect  your immune system, such as prednisone, other steroids, or anticancer drugs; drugs for the treatment of rheumatoid arthritis, Crohn s disease, or psoriasis; or have you had radiation treatments?   No   Have you had a seizure, or a brain or other nervous system problem?   No   During the past year, have you received a transfusion of blood or blood     products, or been given immune (gamma) globulin or antiviral drug?   No   For women: Are you pregnant or is there a chance you could become        pregnant during the next month?   No   Have you received any vaccinations in the past 4 weeks?   No     Immunization questionnaire answers were all negative.      Prior to injection verified patient identity using patient's name and date of birth.    Per orders of Dr. Ortega, injection of HPV given by Raysa Quiroga. Patient instructed to remain in clinic for 15 minutes afterwards, and to report any adverse reaction to me immediately.       Screening performed by Raysa Quiroga on 12/5/2017 at 4:36 PM.

## 2017-12-05 NOTE — NURSING NOTE
"Chief Complaint   Patient presents with     Allied Health Visit     Imm/Inj     2nd HPV      There were no vitals taken for this visit. Estimated body mass index is 21.97 kg/(m^2) as calculated from the following:    Height as of 9/21/17: 5' 4.25\" (1.632 m).    Weight as of 9/21/17: 129 lb (58.5 kg).  bp completed using cuff size: regular       Health Maintenance addressed:  NONE    n/a    Raysa Quiroga MA     "

## 2018-05-07 ENCOUNTER — OFFICE VISIT (OUTPATIENT)
Dept: FAMILY MEDICINE | Facility: CLINIC | Age: 28
End: 2018-05-07
Payer: COMMERCIAL

## 2018-05-07 VITALS
TEMPERATURE: 97.7 F | HEART RATE: 80 BPM | WEIGHT: 128.5 LBS | SYSTOLIC BLOOD PRESSURE: 124 MMHG | DIASTOLIC BLOOD PRESSURE: 82 MMHG | HEIGHT: 64 IN | BODY MASS INDEX: 21.94 KG/M2 | OXYGEN SATURATION: 99 %

## 2018-05-07 DIAGNOSIS — H81.13 BENIGN PAROXYSMAL POSITIONAL VERTIGO DUE TO BILATERAL VESTIBULAR DISORDER: ICD-10-CM

## 2018-05-07 DIAGNOSIS — R50.81 FEVER IN OTHER DISEASES: Primary | ICD-10-CM

## 2018-05-07 DIAGNOSIS — J02.9 SORE THROAT: ICD-10-CM

## 2018-05-07 LAB
BASOPHILS # BLD AUTO: 0 10E9/L (ref 0–0.2)
BASOPHILS NFR BLD AUTO: 0.2 %
DEPRECATED S PYO AG THROAT QL EIA: NORMAL
DIFFERENTIAL METHOD BLD: NORMAL
EOSINOPHIL # BLD AUTO: 0.1 10E9/L (ref 0–0.7)
EOSINOPHIL NFR BLD AUTO: 1.3 %
ERYTHROCYTE [DISTWIDTH] IN BLOOD BY AUTOMATED COUNT: 12.3 % (ref 10–15)
HCT VFR BLD AUTO: 40.7 % (ref 35–47)
HETEROPH AB SER QL: NEGATIVE
HGB BLD-MCNC: 13.4 G/DL (ref 11.7–15.7)
LYMPHOCYTES # BLD AUTO: 1 10E9/L (ref 0.8–5.3)
LYMPHOCYTES NFR BLD AUTO: 23 %
MCH RBC QN AUTO: 30.6 PG (ref 26.5–33)
MCHC RBC AUTO-ENTMCNC: 32.9 G/DL (ref 31.5–36.5)
MCV RBC AUTO: 93 FL (ref 78–100)
MONOCYTES # BLD AUTO: 0.8 10E9/L (ref 0–1.3)
MONOCYTES NFR BLD AUTO: 17.9 %
NEUTROPHILS # BLD AUTO: 2.6 10E9/L (ref 1.6–8.3)
NEUTROPHILS NFR BLD AUTO: 57.6 %
PLATELET # BLD AUTO: 152 10E9/L (ref 150–450)
RBC # BLD AUTO: 4.38 10E12/L (ref 3.8–5.2)
SPECIMEN SOURCE: NORMAL
WBC # BLD AUTO: 4.5 10E9/L (ref 4–11)

## 2018-05-07 PROCEDURE — 99213 OFFICE O/P EST LOW 20 MIN: CPT | Performed by: FAMILY MEDICINE

## 2018-05-07 PROCEDURE — 87081 CULTURE SCREEN ONLY: CPT | Performed by: FAMILY MEDICINE

## 2018-05-07 PROCEDURE — 36415 COLL VENOUS BLD VENIPUNCTURE: CPT | Performed by: FAMILY MEDICINE

## 2018-05-07 PROCEDURE — 87880 STREP A ASSAY W/OPTIC: CPT | Performed by: FAMILY MEDICINE

## 2018-05-07 PROCEDURE — 86308 HETEROPHILE ANTIBODY SCREEN: CPT | Performed by: FAMILY MEDICINE

## 2018-05-07 PROCEDURE — 85025 COMPLETE CBC W/AUTO DIFF WBC: CPT | Performed by: FAMILY MEDICINE

## 2018-05-07 RX ORDER — MECLIZINE HYDROCHLORIDE 25 MG/1
25 TABLET ORAL 3 TIMES DAILY PRN
Qty: 30 TABLET | Refills: 0 | Status: SHIPPED | OUTPATIENT
Start: 2018-05-07

## 2018-05-07 NOTE — LETTER
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  Please excuse from  Work 18.    Please keep us posted with questions or concerns .      Best Regards,    Sirisha Ortega MD  [facility]  [phone]

## 2018-05-07 NOTE — MR AVS SNAPSHOT
After Visit Summary   5/7/2018    Keturah Mcnair    MRN: [id number]           Patient Information     Date Of Birth          1990        Visit Information        Provider Department      5/7/2018 8:40 AM Sirisha Ortega MD [facility]        Today's Diagnoses     Fever in other diseases    -  1    Benign paroxysmal positional vertigo due to bilateral vestibular disorder        Sore throat          Care Instructions      Benign Paroxysmal Positional Vertigo     Your health care provider may move your head in certain ways to treat your BPPV.     Benign paroxysmal positional vertigo (BPPV) is a problem with the inner ear. The inner ear contains the vestibular system. This system is what helps you keep your balance. BPPV causes a feeling of spinning. It is a common problem of the vestibular system.  Understanding the vestibular system  The vestibular system of the ear is made up of very tiny parts. They include the utricle, saccule, and semicircular canals. The utricle is a tiny organ that contains calcium crystals. In some people, the crystals can move into the semicircular canals. When this happens, the system no longer works as it should. This causes BPPV. Benign means it is not life threatening. Paroxysmal means it happens suddenly. Positional means that it happens when you move your head. Vertigo is a feeling of spinning.  What causes BPPV?  Causes include injury to your head or neck. Other problems with the vestibular system may cause BPPV. In many people, the cause of BPPV is not known.  Symptoms of BPPV  You many have repeated feelings of spinning (vertigo). The vertigo usually lasts less than 1 minute. Some movements, such as rolling over in bed, can bring on vertigo.  Diagnosing BPPV  Your primary healthcare provider may diagnose and treat your BPPV. Or you may see an ear, nose, and throat doctor (otolaryngologist). In some cases, you may see a nervous system doctor  (neurologist).  The healthcare provider will ask about your symptoms and your medical history. He or she will examine you. You may have hearing and balance tests. As part of the exam, your healthcare provider may have you move your head and body in certain ways. If you have BPPV, the movements can bring on vertigo. Your provider will also look for abnormal movements of your eyes. You may have other tests to check your vestibular or nervous systems.  Treatment for BPPV  Your healthcare provider may try to move the calcium crystals. This is done by having you move your head and neck in certain ways. This treatment is safe and often works well. You may also be told to do these movements at home. You may still have vertigo for a few weeks. Your healthcare provider will recheck your symptoms, usually in about a month. Special physical therapy may also be part of treatment. In rare cases, surgery may be needed for BPPV that does not go away.     When to call the healthcare provider  Call your healthcare provider right away if you have any of these:    Symptoms that do not go away with treatment    Symptoms that get worse    New symptoms   Date Last Reviewed: 5/1/2017 2000-2017 [employer]. [address] [place] [address]. All rights reserved. This information is not intended as a substitute for professional medical care. Always follow your healthcare professional's instructions.      stay hydrated  If vertigo worsening see provider as needed            Follow-ups after your visit        Who to contact     If you have questions or need follow up information about today's clinic visit or your schedule please contact [facility] directly at [phone].  Normal or non-critical lab and imaging results will be communicated to you by MyChart, letter or phone within 4 business days after the clinic has received the results. If you do not hear from us within 7 days, please contact the clinic  "through [employer] or phone. If you have a critical or abnormal lab result, we will notify you by phone as soon as possible.  Submit refill requests through [employer] or call your pharmacy and they will forward the refill request to us. Please allow 3 business days for your refill to be completed.          Additional Information About Your Visit        [employer] Information     [employer] gives you secure access to your electronic health record. If you see a primary care provider, you can also send messages to your care team and make appointments. If you have questions, please call your primary care clinic.  If you do not have a primary care provider, please call [phone] and they will assist you.        Care EveryWhere ID     This is your Care EveryWhere ID. This could be used by other organizations to access your [facility] medical records  [id number]        Your Vitals Were     Pulse Temperature Height Last Period Pulse Oximetry BMI (Body Mass Index)    80 97.7  F (36.5  C) (Oral) 5' 4.25\" (1.632 m) 04/23/2018 (Approximate) 99% 21.89 kg/m2       Blood Pressure from Last 3 Encounters:   05/07/18 124/82   09/21/17 104/74   07/21/17 104/74    Weight from Last 3 Encounters:   05/07/18 128 lb 8 oz (58.3 kg)   09/21/17 129 lb (58.5 kg)   07/21/17 129 lb 12.8 oz (58.9 kg)              We Performed the Following     Beta strep group A culture     CBC with platelets differential     Mononucleosis screen     Strep, Rapid Screen          Today's Medication Changes          These changes are accurate as of 5/7/18  9:13 AM.  If you have any questions, ask your nurse or doctor.               Start taking these medicines.        Dose/Directions    meclizine 25 MG tablet   Commonly known as:  ANTIVERT   Used for:  Benign paroxysmal positional vertigo due to bilateral vestibular disorder   Started by:  Sirisha Ortega MD        Dose:  25 mg   Take 1 tablet (25 mg) by mouth 3 times daily as needed for dizziness   Quantity:  30 " tablet   Refills:  0         Stop taking these medicines if you haven't already. Please contact your care team if you have questions.     mometasone 50 MCG/ACT spray   Commonly known as:  NASONEX   Stopped by:  Sirisha Ortega MD           montelukast 10 MG tablet   Commonly known as:  SINGULAIR   Stopped by:  Sirisha Ortega MD                Where to get your medicines      These medications were sent to [facility] [place], [place] - [address]  [address], [facility] [address]     Phone:  [phone]     meclizine 25 MG tablet                Primary Care Provider Office Phone # Fax #    Sirisha Ortega [phone] [address] [facility] [address]        Equal Access to Services     [facility]: Hadii nicole ragland hadasho Soomaali, waaxda luqadaha, qaybta kaalmada adejohnie, francy luu . So [facility] [phone].    ATENCIÓN: Si habla español, tiene a beckwith disposición servicios gratuitos de asistencia lingüística. Tegan al [phone].    We comply with applicable federal civil rights laws and [place] laws. We do not discriminate on the basis of race, color, national origin, age, disability, sex, sexual orientation, or gender identity.            Thank you!     Thank you for choosing [facility]  for your care. Our goal is always to provide you with excellent care. Hearing back from our patients is one way we can continue to improve our services. Please take a few minutes to complete the written survey that you may receive in the mail after your visit with us. Thank you!             Your Updated Medication List - Protect others around you: Learn how to safely use, store and throw away your medicines at www.disposemymeds.org.          This list is accurate as of 5/7/18  9:13 AM.  Always use your most recent med list.                   Brand Name Dispense Instructions for use Diagnosis    CLARITIN 10 MG  tablet   Generic drug:  loratadine     30 tablet    Take 1 tablet (10 mg) by mouth daily        fluticasone 50 MCG/ACT spray    FLONASE    3 Bottle    Spray 2 sprays into both nostrils daily        meclizine 25 MG tablet    ANTIVERT    30 tablet    Take 1 tablet (25 mg) by mouth 3 times daily as needed for dizziness    Benign paroxysmal positional vertigo due to bilateral vestibular disorder

## 2018-05-07 NOTE — PATIENT INSTRUCTIONS
Benign Paroxysmal Positional Vertigo     Your health care provider may move your head in certain ways to treat your BPPV.     Benign paroxysmal positional vertigo (BPPV) is a problem with the inner ear. The inner ear contains the vestibular system. This system is what helps you keep your balance. BPPV causes a feeling of spinning. It is a common problem of the vestibular system.  Understanding the vestibular system  The vestibular system of the ear is made up of very tiny parts. They include the utricle, saccule, and semicircular canals. The utricle is a tiny organ that contains calcium crystals. In some people, the crystals can move into the semicircular canals. When this happens, the system no longer works as it should. This causes BPPV. Benign means it is not life threatening. Paroxysmal means it happens suddenly. Positional means that it happens when you move your head. Vertigo is a feeling of spinning.  What causes BPPV?  Causes include injury to your head or neck. Other problems with the vestibular system may cause BPPV. In many people, the cause of BPPV is not known.  Symptoms of BPPV  You many have repeated feelings of spinning (vertigo). The vertigo usually lasts less than 1 minute. Some movements, such as rolling over in bed, can bring on vertigo.  Diagnosing BPPV  Your primary healthcare provider may diagnose and treat your BPPV. Or you may see an ear, nose, and throat doctor (otolaryngologist). In some cases, you may see a nervous system doctor (neurologist).  The healthcare provider will ask about your symptoms and your medical history. He or she will examine you. You may have hearing and balance tests. As part of the exam, your healthcare provider may have you move your head and body in certain ways. If you have BPPV, the movements can bring on vertigo. Your provider will also look for abnormal movements of your eyes. You may have other tests to check your vestibular or nervous systems.  Treatment  for BPPV  Your healthcare provider may try to move the calcium crystals. This is done by having you move your head and neck in certain ways. This treatment is safe and often works well. You may also be told to do these movements at home. You may still have vertigo for a few weeks. Your healthcare provider will recheck your symptoms, usually in about a month. Special physical therapy may also be part of treatment. In rare cases, surgery may be needed for BPPV that does not go away.     When to call the healthcare provider  Call your healthcare provider right away if you have any of these:    Symptoms that do not go away with treatment    Symptoms that get worse    New symptoms   Date Last Reviewed: 5/1/2017 2000-2017 [employer]. [address] [place] [address], [place] [address]. All rights reserved. This information is not intended as a substitute for professional medical care. Always follow your healthcare professional's instructions.      stay hydrated  If vertigo worsening see provider as needed

## 2018-05-07 NOTE — PROGRESS NOTES
SUBJECTIVE:   Keturah Mcnair is a 27 year old female who presents to clinic today for the following health issues:    Sore throat this weekend, moderate to sever  Has taken over the counter ibuprofen & tylenol  Fever went up to 102  Feels ears pulged    This weekend also felt dizzy, room was spinning, mati if driving, looking up or turning neck. No numbness, sensation,  speech/memory is clear    RESPIRATORY SYMPTOMS      Duration: x3 days     Description  sore throat, facial pain/pressure, fever PEAK 102 and headache    Severity: severe    Accompanying signs and symptoms: body aches     History (predisposing factors):  none    Precipitating or alleviating factors: None    Therapies tried and outcome:  Sudafed, alternating tylenol and ibuprofen - helps with sx temporarily       PROBLEMS TO ADD ON...    Problem list and histories reviewed & adjusted, as indicated.  Additional history: as documented    Patient Active Problem List   Diagnosis     Other chronic rhinitis     Cyst of left Bartholin's gland     Past Surgical History:   Procedure Laterality Date     MARSUPIALIZATION BARTHOLIN CYST Left 12/2016       Social History   Substance Use Topics     Smoking status: Never Smoker     Smokeless tobacco: Never Used     Alcohol use Not on file     Family History   Problem Relation Age of Onset     HEART DISEASE Maternal Grandmother      Thyroid Disease Maternal Grandmother      Thyroid Disease Paternal Grandmother      Coronary Artery Disease Paternal Grandfather      Hypertension Paternal Grandfather      CEREBROVASCULAR DISEASE Paternal Grandfather      Prostate Cancer Paternal Grandfather            Reviewed and updated as needed this visit by clinical staff  Tobacco  Allergies  Meds  Problems       Reviewed and updated as needed this visit by Provider  Meds  Problems         ROS:  Constitutional, HEENT, cardiovascular, pulmonary, gi and gu systems are negative, except as otherwise noted.    OBJECTIVE:     BP  "124/82  Pulse 80  Temp 97.7  F (36.5  C) (Oral)  Ht 5' 4.25\" (1.632 m)  Wt 128 lb 8 oz (58.3 kg)  LMP 04/23/2018 (Approximate)  SpO2 99%  BMI 21.89 kg/m2  Body mass index is 21.89 kg/(m^2).  GENERAL: healthy, alert and no distress  HENT: hyperemic oropharynx   ear canals and TM's normal, nose and mouth without ulcers or lesions  NECK: no adenopathy, no asymmetry, masses, or scars and thyroid normal to palpation  RESP: lungs clear to auscultation - no rales, rhonchi or wheezes  CV: regular rate and rhythm, normal S1 S2, no S3 or S4, no murmur, click or rub, no peripheral edema and peripheral pulses strong  ABDOMEN: soft, nontender, no hepatosplenomegaly, no masses and bowel sounds normal  NEURO: Normal strength and tone, mentation intact and speech normal  Mifflin hallpike asa negative   Diagnostic Test Results:  Results for orders placed or performed in visit on 05/07/18 (from the past 24 hour(s))   Strep, Rapid Screen   Result Value Ref Range    Specimen Description Throat     Rapid Strep A Screen       NEGATIVE: No Group A streptococcal antigen detected by immunoassay, await culture report.       ASSESSMENT/PLAN:   Fever with sore throat   Rapid strept is negative  Sore throat is moderate and with High grade fever- its possibly viral, rule out mono  Complete blood count , mono sadia for on going ft and fever  She is excused from  Work for tomorrow  Advised symptomatic treatment with relative rest, good fluids, hydration    Ibuprofen 600 mg up to three times daily, as needed , always with meals      BPPV  Meclizine over the counter as needed   Anticipate improvement  Follow up as needed  If getting any worse    History of Allergic rhinitis  Flonase as needed    Over the counter Claritin once daily     Potential medication side effects were discussed with the patient; let me know if any occur.  The patient indicates understanding of these issues and agrees with the plan.    .              Sirisha Ortega, " MD  [facility]

## 2018-05-07 NOTE — NURSING NOTE
"Chief Complaint   Patient presents with     Pharyngitis     /82  Pulse 80  Temp 97.7  F (36.5  C) (Oral)  Ht 5' 4.25\" (1.632 m)  Wt 128 lb 8 oz (58.3 kg)  LMP 04/23/2018 (Approximate)  SpO2 99%  BMI 21.89 kg/m2 Estimated body mass index is 21.89 kg/(m^2) as calculated from the following:    Height as of this encounter: 5' 4.25\" (1.632 m).    Weight as of this encounter: 128 lb 8 oz (58.3 kg).  Medication Reconciliation: complete      Health Maintenance that is potentially due pending provider review:  NONE    n/a    JASON Taylor  "

## 2018-05-08 LAB
BACTERIA SPEC CULT: NORMAL
SPECIMEN SOURCE: NORMAL

## 2018-05-08 NOTE — PROGRESS NOTES
-Normal red blood cell (hgb) levels, normal white blood cell count and normal platelet levels.  Negative for mono infection  Negative strept culture as well    Sirisha Ortega                 5/8/2018 5:28 PM

## 2018-07-05 ENCOUNTER — OFFICE VISIT (OUTPATIENT)
Dept: FAMILY MEDICINE | Facility: CLINIC | Age: 28
End: 2018-07-05
Payer: COMMERCIAL

## 2018-07-05 VITALS
HEIGHT: 64 IN | OXYGEN SATURATION: 99 % | RESPIRATION RATE: 14 BRPM | SYSTOLIC BLOOD PRESSURE: 112 MMHG | BODY MASS INDEX: 21.34 KG/M2 | TEMPERATURE: 98.8 F | WEIGHT: 125 LBS | HEART RATE: 90 BPM | DIASTOLIC BLOOD PRESSURE: 75 MMHG

## 2018-07-05 DIAGNOSIS — N75.0 CYST OF LEFT BARTHOLIN'S GLAND: ICD-10-CM

## 2018-07-05 DIAGNOSIS — R45.86 MOOD CHANGES: Primary | ICD-10-CM

## 2018-07-05 DIAGNOSIS — Z83.49 FAMILY HISTORY OF THYROID DISORDER: ICD-10-CM

## 2018-07-05 PROCEDURE — 99214 OFFICE O/P EST MOD 30 MIN: CPT | Performed by: FAMILY MEDICINE

## 2018-07-05 RX ORDER — MULTIVIT-MIN/IRON/FOLIC ACID/K 18-600-40
1000 CAPSULE ORAL DAILY
COMMUNITY

## 2018-07-05 ASSESSMENT — ANXIETY QUESTIONNAIRES
IF YOU CHECKED OFF ANY PROBLEMS ON THIS QUESTIONNAIRE, HOW DIFFICULT HAVE THESE PROBLEMS MADE IT FOR YOU TO DO YOUR WORK, TAKE CARE OF THINGS AT HOME, OR GET ALONG WITH OTHER PEOPLE: SOMEWHAT DIFFICULT
2. NOT BEING ABLE TO STOP OR CONTROL WORRYING: MORE THAN HALF THE DAYS
1. FEELING NERVOUS, ANXIOUS, OR ON EDGE: MORE THAN HALF THE DAYS
GAD7 TOTAL SCORE: 9
6. BECOMING EASILY ANNOYED OR IRRITABLE: MORE THAN HALF THE DAYS
3. WORRYING TOO MUCH ABOUT DIFFERENT THINGS: MORE THAN HALF THE DAYS
7. FEELING AFRAID AS IF SOMETHING AWFUL MIGHT HAPPEN: SEVERAL DAYS
5. BEING SO RESTLESS THAT IT IS HARD TO SIT STILL: NOT AT ALL

## 2018-07-05 ASSESSMENT — PATIENT HEALTH QUESTIONNAIRE - PHQ9: 5. POOR APPETITE OR OVEREATING: NOT AT ALL

## 2018-07-05 NOTE — MR AVS SNAPSHOT
After Visit Summary   7/5/2018    Keturah Mcnair    MRN: [id number]           Patient Information     Date Of Birth          1990        Visit Information        Provider Department      7/5/2018 1:40 PM Sirisha Ortega MD [facility]        Today's Diagnoses     Mood changes (H)    -  1    Cyst of left Bartholin's gland        Family history of thyroid disorder          Care Instructions    ParaGard           Follow-ups after your visit        Your next 10 appointments already scheduled     Jul 10, 2018  9:00 AM CDT   Office Visit with Sirisha Ortega MD, UP PROC RM 1   [facility] ([facility])    [address] [address] [phone]           Bring a current list of meds and any records pertaining to this visit. For Physicals, please bring immunization records and any forms needing to be filled out. Please arrive 10 minutes early to complete paperwork.              Who to contact     If you have questions or need follow up information about today's clinic visit or your schedule please contact [facility] directly at [phone].  Normal or non-critical lab and imaging results will be communicated to you by MyChart, letter or phone within 4 business days after the clinic has received the results. If you do not hear from us within 7 days, please contact the clinic through [employer] or phone. If you have a critical or abnormal lab result, we will notify you by phone as soon as possible.  Submit refill requests through [employer] or call your pharmacy and they will forward the refill request to us. Please allow 3 business days for your refill to be completed.          Additional Information About Your Visit        MyChart Information     [employer] gives you secure access to your electronic health record. If you see a primary care provider, you can also send messages to your care team and make appointments. If you have  "questions, please call your primary care clinic.  If you do not have a primary care provider, please call [phone] and they will assist you.        Care EveryWhere ID     This is your Care EveryWhere ID. This could be used by other organizations to access your [place] medical records  [id number]        Your Vitals Were     Pulse Temperature Respirations Height Pulse Oximetry Breastfeeding?    90 98.8  F (37.1  C) (Oral) 14 5' 4.25\" (1.632 m) 99% No    BMI (Body Mass Index)                   21.29 kg/m2            Blood Pressure from Last 3 Encounters:   07/05/18 112/75   05/07/18 124/82   09/21/17 104/74    Weight from Last 3 Encounters:   07/05/18 125 lb (56.7 kg)   05/07/18 128 lb 8 oz (58.3 kg)   09/21/17 129 lb (58.5 kg)              Today, you had the following     No orders found for display       Primary Care Provider Office Phone # Fax #    Sirisha Herb Ortega [phone] [address] [address]        Equal Access to Services     [facility]: Hadii nicole durano Sotiffany, waaxda luqadaha, qaybta kaalmada adejohnie, francy luu . So [facility] [phone].    ATENCIÓN: Si habla español, tiene a beckwith disposición servicios gratuitos de asistencia lingüística. Llame al [phone].    We comply with applicable federal civil rights laws and [place] laws. We do not discriminate on the basis of race, color, national origin, age, disability, sex, sexual orientation, or gender identity.            Thank you!     Thank you for choosing [facility]  for your care. Our goal is always to provide you with excellent care. Hearing back from our patients is one way we can continue to improve our services. Please take a few minutes to complete the written survey that you may receive in the mail after your visit with us. Thank you!             Your Updated Medication List - Protect others around you: Learn how to safely use, store and throw " away your medicines at www.disposemymeds.org.          This list is accurate as of 7/5/18 11:59 PM.  Always use your most recent med list.                   Brand Name Dispense Instructions for use Diagnosis    CLARITIN 10 MG tablet   Generic drug:  loratadine     30 tablet    Take 1 tablet (10 mg) by mouth daily        fluticasone 50 MCG/ACT spray    FLONASE    3 Bottle    Spray 2 sprays into both nostrils daily        levonorgestrel 20 MCG/24HR IUD    MIRENA     1 each by Intrauterine route once        meclizine 25 MG tablet    ANTIVERT    30 tablet    Take 1 tablet (25 mg) by mouth 3 times daily as needed for dizziness    Benign paroxysmal positional vertigo due to bilateral vestibular disorder       vitamin B complex with vitamin C Tabs tablet      Take 1 tablet by mouth daily        Vitamin D (Cholecalciferol) 1000 units Tabs      Take 1,000 Units by mouth daily

## 2018-07-05 NOTE — PROGRESS NOTES
SUBJECTIVE:   Keturah Mcnair is a 27 year old female who presents to clinic today for the following health issues:      Mood changes      Duration: over last year getting worse    Description (location/character/radiation): mood swings, rageful, yelling, feels out of control, disrupting her life    Intensity:  severe    Accompanying signs and symptoms: some depression with period-anxiety-    History (similar episodes/previous evaluation): tried therapy for anxiety-helped but this feels different    Precipitating or alleviating factors: pt moved recently-new job search-getting  in ayear    Therapies tried and outcome: talk therapy-no previous medications     Out burst of angry sudden, low motivation, anxiety particularly a week before periods  Last menstural period :June 13, 2018  Feels symptoms of anxiety/ depression since placement of  merina - 3 yrs ago   that's been the case for past 3 yr, worse this year  Had to change a number of oral contraceptive pills- before finding one- that did not affect the mood . Does want to be off hormones and considering ParaGard IUD  ROBBY-7 SCORE 7/5/2018   Total Score 9     Grand mom with thyroid  No symptoms of hypo or hyperthyroidism: no decreased or increased weight, no feeling cold/chilly or excessively warm, no diarrhea or constipation, no undue sweatiness,  or palpitations.      PROBLEMS TO ADD ON...    Problem list and histories reviewed & adjusted, as indicated.  Additional history: as documented    Patient Active Problem List   Diagnosis     Other chronic rhinitis     Cyst of left Bartholin's gland     Past Surgical History:   Procedure Laterality Date     MARSUPIALIZATION BARTHOLIN CYST Left 12/2016       Social History   Substance Use Topics     Smoking status: Never Smoker     Smokeless tobacco: Never Used     Alcohol use Not on file     Family History   Problem Relation Age of Onset     HEART DISEASE Maternal Grandmother      Thyroid Disease Maternal  "Grandmother      Thyroid Disease Paternal Grandmother      Coronary Artery Disease Paternal Grandfather      Hypertension Paternal Grandfather      Cerebrovascular Disease Paternal Grandfather      Prostate Cancer Paternal Grandfather            Reviewed and updated as needed this visit by clinical staff  Tobacco  Allergies  Meds  Med Hx  Surg Hx  Fam Hx  Soc Hx      Reviewed and updated as needed this visit by Provider         ROS:  Constitutional, HEENT, cardiovascular, pulmonary, gi and gu systems are negative, except as otherwise noted.    OBJECTIVE:     /75  Pulse 90  Temp 98.8  F (37.1  C) (Oral)  Resp 14  Ht 5' 4.25\" (1.632 m)  Wt 125 lb (56.7 kg)  SpO2 99%  Breastfeeding? No  BMI 21.29 kg/m2  Body mass index is 21.29 kg/(m^2).  GENERAL: healthy, alert and no distress  NECK: no adenopathy, no asymmetry, masses, or scars and thyroid normal to palpation  RESP: lungs clear to auscultation - no rales, rhonchi or wheezes  CV: regular rate and rhythm, normal S1 S2, no S3 or S4, no murmur, click or rub, no peripheral edema and peripheral pulses strong  ABDOMEN: soft, nontender, no hepatosplenomegaly, no masses and bowel sounds normal  PSYCH: mentation appears normal, affect normal/bright    Diagnostic Test Results:    ASSESSMENT/PLAN:     1. Mood changes (H)  premenstrual out burst of anger  Treatment  options Discussed  Willing to proceed with counseling at [facility]  Medications / selective serotonin reuptake inhibitor discussed.  Does not want to consider medications yet   Relates symptoms to the insertion of  Merina Placement  OK to have it removed & will make a separate appointment for insertion of ParaGard - same appointment     The procedure of IUD removal & reinsertion discussed in detail , pros & cons, complications of the procedure.she is not allergic to copper. No recent pelvic pain, unexplained vaginal bleeding, no ab pap smear. Declined concerns with sexually transmitted " infection. In mutually monogamous relationship.    Family history of thyroid-& she has no symptoms of hypo or hyper thyroid but that can be considered in future- if more concerns    2. Cyst of left Bartholin's gland  Does inflame on and off- S/P marsupialization- currently ok     Total time with patient today was 25 minutes, more than 15 minutes spent in counseling regarding interpretation of clinical signs and symptoms and going through differentials,additional diagnostic testing options,treatment plan and follow up recommendation.   27 year old      Sirisha Ortega MD  [facility]

## 2018-07-05 NOTE — NURSING NOTE
"Chief Complaint   Patient presents with     MOOD CHANGES       Initial /75  Pulse 90  Temp 98.8  F (37.1  C) (Oral)  Resp 14  Ht 5' 4.25\" (1.632 m)  Wt 125 lb (56.7 kg)  SpO2 99%  Breastfeeding? No  BMI 21.29 kg/m2 Estimated body mass index is 21.29 kg/(m^2) as calculated from the following:    Height as of this encounter: 5' 4.25\" (1.632 m).    Weight as of this encounter: 125 lb (56.7 kg).  BP completed using cuff size: regular    Health Maintenance that is potentially due pending provider review:  Health Maintenance Due   Topic Date Due     HIV SCREEN (SYSTEM ASSIGNED)  10/20/2009     PHQ-2 Q1 YR  07/21/2018         Per provider  "

## 2018-07-06 ASSESSMENT — ANXIETY QUESTIONNAIRES: GAD7 TOTAL SCORE: 9

## 2018-07-06 ASSESSMENT — PATIENT HEALTH QUESTIONNAIRE - PHQ9: SUM OF ALL RESPONSES TO PHQ QUESTIONS 1-9: 3

## 2018-07-10 ENCOUNTER — OFFICE VISIT (OUTPATIENT)
Dept: FAMILY MEDICINE | Facility: CLINIC | Age: 28
End: 2018-07-10
Payer: COMMERCIAL

## 2018-07-10 VITALS
WEIGHT: 124.6 LBS | DIASTOLIC BLOOD PRESSURE: 79 MMHG | SYSTOLIC BLOOD PRESSURE: 116 MMHG | HEART RATE: 70 BPM | HEIGHT: 64 IN | BODY MASS INDEX: 21.27 KG/M2 | OXYGEN SATURATION: 100 % | TEMPERATURE: 98.4 F

## 2018-07-10 DIAGNOSIS — Z01.812 PRE-OPERATIVE LABORATORY EXAMINATION: Primary | ICD-10-CM

## 2018-07-10 DIAGNOSIS — Z30.430 ENCOUNTER FOR INSERTION OF COPPER IUD: ICD-10-CM

## 2018-07-10 DIAGNOSIS — Z30.432 ENCOUNTER FOR REMOVAL OF INTRAUTERINE CONTRACEPTIVE DEVICE (IUD): ICD-10-CM

## 2018-07-10 LAB — BETA HCG QUAL IFA URINE: NEGATIVE

## 2018-07-10 PROCEDURE — 58301 REMOVE INTRAUTERINE DEVICE: CPT | Performed by: FAMILY MEDICINE

## 2018-07-10 PROCEDURE — 58300 INSERT INTRAUTERINE DEVICE: CPT | Performed by: FAMILY MEDICINE

## 2018-07-10 PROCEDURE — 84703 CHORIONIC GONADOTROPIN ASSAY: CPT | Performed by: FAMILY MEDICINE

## 2018-07-10 NOTE — NURSING NOTE
"Chief Complaint   Patient presents with     IUD     removal and insert      /79  Pulse 70  Temp 98.4  F (36.9  C) (Oral)  Ht 5' 4.25\" (1.632 m)  Wt 124 lb 9.6 oz (56.5 kg)  LMP 07/09/2018 (Exact Date)  SpO2 100%  Breastfeeding? No  BMI 21.22 kg/m2 Estimated body mass index is 21.22 kg/(m^2) as calculated from the following:    Height as of this encounter: 5' 4.25\" (1.632 m).    Weight as of this encounter: 124 lb 9.6 oz (56.5 kg).  Medication Reconciliation: complete      Health Maintenance that is potentially due pending provider review:  NONE    n/a    JASON Taylor  "

## 2018-07-10 NOTE — PROGRESS NOTES
SUBJECTIVE:   Keturah Mcnair is a 27 year old female who presents to clinic today for the following health issues:      Chief Complaint   Patient presents with     IUD     removal and insert      Paraguard IUD insertion,  Paragaurd ()  NDC 08674-048-87  Verification of Procedure  Just before the procedure begins, through verbal and active participation of team members, verify:   Initials   Patient Name Keturah Mcnair    Procedure to be performed AS    Reason for insertion:  Contraception.& mood control     Pregnancy test: Negative  Past GYN history:  No STD history  Last PAP smear:  Normal  Manogomous relationship? Yes  Urine pregnancy test negative     Counseling:  Patient counseled on potential side effects of Paragard.   Reminded patient to check for IUD strings every month.       Consent:  Risks, benefits of treatment, and no treatment were discussed.  Patient's questions were elicited and answered. , Written consent signed and scanned into medical record. and Patient received and verbalized understanding of discharge instructions    Procedure note:   Patient was pre-medicated with her own ibuprofen  prior to beginning the procedure.   The cervix was visualized with a medium Graves speculum.  The old IUD was removed with ring forceps. The cervix was prepped with betadine solution. Tenaculum was placed at the 12 o'clock position on the cervix.  The uterus was sounded to 6.0 cm. The ParaGard IUD insertion apparatus was prepared and placed through the cervix without significant resistance and deployed at the fundus in the usual fashion under sterile technique. The strings were trimmed to mid vagina.     Advised to check IUD monthly  Return office visit as needed  With concerns or in 6 weeks  Removal  By 10 yrs , earlier as needed        Patient tolerated the procedure well  Advised follow up in 6 weeks  Will see if change in IUD improves her mood as well or makes menstrual mood swings worse- then should  be seen in clinic early.  Last menstural period started 07/09/18      1. Pre-operative laboratory examination    - Beta HCG qual IFA urine- negative     2. Encounter for removal of intrauterine contraceptive device (IUD)  Merina     3. Encounter for insertion of copper IUD    - INSERTION INTRAUTERINE DEVICE  - paragard intrauterine copper; 1 each by Intrauterine route once for 1 dose  Dispense: 1 each

## 2018-07-10 NOTE — MR AVS SNAPSHOT
"              After Visit Summary   7/10/2018    Keturah Mcnair    MRN: [id number]           Patient Information     Date Of Birth          1990        Visit Information        Provider Department      7/10/2018 9:00 AM Sirisha Ortega MD; UP PROC RM 1 [facility]        Today's Diagnoses     Pre-operative laboratory examination    -  1    Encounter for removal of intrauterine contraceptive device (IUD)        Encounter for insertion of copper IUD           Follow-ups after your visit        Who to contact     If you have questions or need follow up information about today's clinic visit or your schedule please contact [facility] directly at [phone].  Normal or non-critical lab and imaging results will be communicated to you by MyChart, letter or phone within 4 business days after the clinic has received the results. If you do not hear from us within 7 days, please contact the clinic through [employer] or phone. If you have a critical or abnormal lab result, we will notify you by phone as soon as possible.  Submit refill requests through [employer] or call your pharmacy and they will forward the refill request to us. Please allow 3 business days for your refill to be completed.          Additional Information About Your Visit        MyChart Information     [employer] gives you secure access to your electronic health record. If you see a primary care provider, you can also send messages to your care team and make appointments. If you have questions, please call your primary care clinic.  If you do not have a primary care provider, please call [phone] and they will assist you.        Care EveryWhere ID     This is your Care EveryWhere ID. This could be used by other organizations to access your [place] medical records  [id number]        Your Vitals Were     Pulse Temperature Height Last Period Pulse Oximetry Breastfeeding?    70 98.4  F (36.9  C) (Oral) 5' 4.25\" (1.632 m) " 07/09/2018 (Exact Date) 100% No    BMI (Body Mass Index)                   21.22 kg/m2            Blood Pressure from Last 3 Encounters:   07/10/18 116/79   07/05/18 112/75   05/07/18 124/82    Weight from Last 3 Encounters:   07/10/18 124 lb 9.6 oz (56.5 kg)   07/05/18 125 lb (56.7 kg)   05/07/18 128 lb 8 oz (58.3 kg)              We Performed the Following     Beta HCG qual IFA urine     INSERTION INTRAUTERINE DEVICE          Today's Medication Changes          These changes are accurate as of 7/10/18 11:59 PM.  If you have any questions, ask your nurse or doctor.               Start taking these medicines.        Dose/Directions    paragard intrauterine copper   Used for:  Encounter for insertion of copper IUD   Started by:  Sirisha Ortega MD        Dose:  1 each   Start taking on:  7/10/2029   1 each by Intrauterine route once for 1 dose   Quantity:  1 each   Refills:  0         Stop taking these medicines if you haven't already. Please contact your care team if you have questions.     levonorgestrel 20 MCG/24HR IUD   Commonly known as:  MIRENA   Stopped by:  Sirisha Ortega MD                Where to get your medicines      Some of these will need a paper prescription and others can be bought over the counter.  Ask your nurse if you have questions.     You don't need a prescription for these medications     paragard intrauterine copper                Primary Care Provider Office Phone # Fax #    Sirisha Ortega [phone] [address] [facility] [address]        Equal Access to Services     [facility] : Katia Oliva, walaurada luqadaha, qaybta kaalmafrancy daniel idicesar gaston. So [facility] [phone].    ATENCIÓN: Si habla español, tiene a beckwith disposición servicios gratuitos de asistencia lingüística. Llame al [phone].    We comply with applicable federal civil rights laws and [place] laws. We do not discriminate on  the basis of race, color, national origin, age, disability, sex, sexual orientation, or gender identity.            Thank you!     Thank you for choosing [facility]  for your care. Our goal is always to provide you with excellent care. Hearing back from our patients is one way we can continue to improve our services. Please take a few minutes to complete the written survey that you may receive in the mail after your visit with us. Thank you!             Your Updated Medication List - Protect others around you: Learn how to safely use, store and throw away your medicines at www.disposemymeds.org.          This list is accurate as of 7/10/18 11:59 PM.  Always use your most recent med list.                   Brand Name Dispense Instructions for use Diagnosis    CLARITIN 10 MG tablet   Generic drug:  loratadine     30 tablet    Take 1 tablet (10 mg) by mouth daily        fluticasone 50 MCG/ACT spray    FLONASE    3 Bottle    Spray 2 sprays into both nostrils daily        meclizine 25 MG tablet    ANTIVERT    30 tablet    Take 1 tablet (25 mg) by mouth 3 times daily as needed for dizziness    Benign paroxysmal positional vertigo due to bilateral vestibular disorder       paragard intrauterine copper   Start taking on:  7/10/2029     1 each    1 each by Intrauterine route once for 1 dose    Encounter for insertion of copper IUD       vitamin B complex with vitamin C Tabs tablet      Take 1 tablet by mouth daily        Vitamin D (Cholecalciferol) 1000 units Tabs      Take 1,000 Units by mouth daily

## 2018-07-14 RX ORDER — COPPER 313.4 MG/1
1 INTRAUTERINE DEVICE INTRAUTERINE ONCE
Qty: 1 EACH
Start: 2029-07-10 | End: 2029-07-10

## 2018-08-23 ENCOUNTER — OFFICE VISIT (OUTPATIENT)
Dept: FAMILY MEDICINE | Facility: CLINIC | Age: 28
End: 2018-08-23
Payer: COMMERCIAL

## 2018-08-23 VITALS
RESPIRATION RATE: 15 BRPM | BODY MASS INDEX: 21 KG/M2 | WEIGHT: 123 LBS | TEMPERATURE: 97.8 F | HEIGHT: 64 IN | SYSTOLIC BLOOD PRESSURE: 118 MMHG | DIASTOLIC BLOOD PRESSURE: 78 MMHG | HEART RATE: 68 BPM

## 2018-08-23 DIAGNOSIS — Z97.5 PRESENCE OF INTRAUTERINE CONTRACEPTIVE DEVICE: Primary | ICD-10-CM

## 2018-08-23 PROCEDURE — 99213 OFFICE O/P EST LOW 20 MIN: CPT | Performed by: FAMILY MEDICINE

## 2018-08-23 NOTE — PROGRESS NOTES
"  SUBJECTIVE:   Keturah Mcnair is a 27 year old female who presents to clinic today for the following health issues:    Was on Merina for 3 yrs with worse mood swings  Merina removed &  ParaGrad placed in 7/10/18  She reports mood has notably improved    Some menstrual cramping  Last menstural period 8/10/18- not heavy bleeding      PROBLEMS TO ADD ON...    Problem list and histories reviewed & adjusted, as indicated.  Additional history: as documented    Patient Active Problem List   Diagnosis     Other chronic rhinitis     Cyst of left Bartholin's gland     Past Surgical History:   Procedure Laterality Date     MARSUPIALIZATION BARTHOLIN CYST Left 12/2016       Social History   Substance Use Topics     Smoking status: Never Smoker     Smokeless tobacco: Never Used     Alcohol use Not on file     Family History   Problem Relation Age of Onset     HEART DISEASE Maternal Grandmother      Thyroid Disease Maternal Grandmother      Thyroid Disease Paternal Grandmother      Coronary Artery Disease Paternal Grandfather      Hypertension Paternal Grandfather      Cerebrovascular Disease Paternal Grandfather      Prostate Cancer Paternal Grandfather            Reviewed and updated as needed this visit by clinical staff  Tobacco  Allergies  Meds       Reviewed and updated as needed this visit by Provider         ROS:  Constitutional, HEENT, cardiovascular, pulmonary, gi and gu systems are negative, except as otherwise noted.    OBJECTIVE:     /78  Pulse 68  Temp 97.8  F (36.6  C) (Oral)  Resp 15  Ht 5' 4.25\" (1.632 m)  Wt 123 lb (55.8 kg)  LMP 08/09/2018  Breastfeeding? No  BMI 20.95 kg/m2  Body mass index is 20.95 kg/(m^2).  GENERAL: healthy, alert and no distress  ABDOMEN: soft, nontender, no hepatosplenomegaly, no masses and bowel sounds normal   (female): normal female external genitalia, normal urethral meatus , vaginal mucosa pink, moist, well rugated and IUD string in cervix    ASSESSMENT/PLAN: "   1. Presence of intrauterine contraceptive device  ParaGard- improved mood swings  Would like to continue  Follow up as needed            Sirisha Ortega MD  [facility]

## 2018-08-23 NOTE — MR AVS SNAPSHOT
"              After Visit Summary   8/23/2018    Keturah Mcnair    MRN: [id number]           Patient Information     Date Of Birth          1990        Visit Information        Provider Department      8/23/2018 10:40 AM Sirisha Ortega MD [facility]        Today's Diagnoses     Presence of intrauterine contraceptive device    -  1       Follow-ups after your visit        Who to contact     If you have questions or need follow up information about today's clinic visit or your schedule please contact [facility] directly at [phone].  Normal or non-critical lab and imaging results will be communicated to you by [email or web address], letter or phone within 4 business days after the clinic has received the results. If you do not hear from us within 7 days, please contact the clinic through Dada Roomt or phone. If you have a critical or abnormal lab result, we will notify you by phone as soon as possible.  Submit refill requests through appsplit or call your pharmacy and they will forward the refill request to us. Please allow 3 business days for your refill to be completed.          Additional Information About Your Visit        MyChart Information     appsplit gives you secure access to your electronic health record. If you see a primary care provider, you can also send messages to your care team and make appointments. If you have questions, please call your primary care clinic.  If you do not have a primary care provider, please call [phone] and they will assist you.        Care EveryWhere ID     This is your Care EveryWhere ID. This could be used by other organizations to access your [place] medical records  [id number]        Your Vitals Were     Pulse Temperature Respirations Height Last Period Breastfeeding?    68 97.8  F (36.6  C) (Oral) 15 5' 4.25\" (1.632 m) 08/09/2018 No    BMI (Body Mass Index)                   20.95 kg/m2            Blood Pressure from Last 3 Encounters: "   08/23/18 118/78   07/10/18 116/79   07/05/18 112/75    Weight from Last 3 Encounters:   08/23/18 123 lb (55.8 kg)   07/10/18 124 lb 9.6 oz (56.5 kg)   07/05/18 125 lb (56.7 kg)              Today, you had the following     No orders found for display       Primary Care Provider Office Phone # Fax #    Sirisha Herb Ortega [phone] [address] [facility] [address]        Equal Access to Services     [facility]: Hadii nicole ragland hadgerman Sotiffany, waaxda luqadaha, qaybta kaalmada felicita, francy luu . So [facility] [place] [phone].    ATENCIÓN: Si habla español, tiene a beckwith disposición servicios gratuitos de asistencia lingüística. [facility] [phone].    We comply with applicable federal civil rights laws and [place] laws. We do not discriminate on the basis of race, color, national origin, age, disability, sex, sexual orientation, or gender identity.            Thank you!     Thank you for choosing [facility]  for your care. Our goal is always to provide you with excellent care. Hearing back from our patients is one way we can continue to improve our services. Please take a few minutes to complete the written survey that you may receive in the mail after your visit with us. Thank you!             Your Updated Medication List - Protect others around you: Learn how to safely use, store and throw away your medicines at www.disposemymeds.org.          This list is accurate as of 8/23/18 11:08 AM.  Always use your most recent med list.                   Brand Name Dispense Instructions for use Diagnosis    CLARITIN 10 MG tablet   Generic drug:  loratadine     30 tablet    Take 1 tablet (10 mg) by mouth daily        fluticasone 50 MCG/ACT spray    FLONASE    3 Bottle    Spray 2 sprays into both nostrils daily        meclizine 25 MG tablet    ANTIVERT    30 tablet    Take 1 tablet (25 mg) by mouth 3 times daily as needed for dizziness    Benign  paroxysmal positional vertigo due to bilateral vestibular disorder       paragard intrauterine copper   Start taking on:  7/10/2029     1 each    1 each by Intrauterine route once for 1 dose    Encounter for insertion of copper IUD       vitamin B complex with vitamin C Tabs tablet      Take 1 tablet by mouth daily        Vitamin D (Cholecalciferol) 1000 units Tabs      Take 1,000 Units by mouth daily

## 2020-03-11 ENCOUNTER — HEALTH MAINTENANCE LETTER (OUTPATIENT)
Age: 30
End: 2020-03-11

## 2020-12-27 ENCOUNTER — HEALTH MAINTENANCE LETTER (OUTPATIENT)
Age: 30
End: 2020-12-27

## 2021-04-24 ENCOUNTER — HEALTH MAINTENANCE LETTER (OUTPATIENT)
Age: 31
End: 2021-04-24

## 2021-10-09 ENCOUNTER — HEALTH MAINTENANCE LETTER (OUTPATIENT)
Age: 31
End: 2021-10-09

## 2022-05-21 ENCOUNTER — HEALTH MAINTENANCE LETTER (OUTPATIENT)
Age: 32
End: 2022-05-21

## 2022-09-17 ENCOUNTER — HEALTH MAINTENANCE LETTER (OUTPATIENT)
Age: 32
End: 2022-09-17

## 2023-06-04 ENCOUNTER — HEALTH MAINTENANCE LETTER (OUTPATIENT)
Age: 33
End: 2023-06-04